# Patient Record
Sex: MALE | Race: BLACK OR AFRICAN AMERICAN
[De-identification: names, ages, dates, MRNs, and addresses within clinical notes are randomized per-mention and may not be internally consistent; named-entity substitution may affect disease eponyms.]

---

## 2022-01-04 LAB
AVERAGE GLUCOSE: 128
HBA1C MFR BLD: 6.1 %

## 2022-06-13 ENCOUNTER — OFFICE VISIT (OUTPATIENT)
Dept: INTERNAL MEDICINE CLINIC | Facility: CLINIC | Age: 47
End: 2022-06-13
Payer: COMMERCIAL

## 2022-06-13 VITALS
DIASTOLIC BLOOD PRESSURE: 99 MMHG | TEMPERATURE: 98.4 F | HEART RATE: 79 BPM | BODY MASS INDEX: 33.59 KG/M2 | HEIGHT: 66 IN | WEIGHT: 209 LBS | OXYGEN SATURATION: 98 % | SYSTOLIC BLOOD PRESSURE: 154 MMHG

## 2022-06-13 DIAGNOSIS — E78.5 DYSLIPIDEMIA: ICD-10-CM

## 2022-06-13 DIAGNOSIS — N18.32 CHRONIC KIDNEY DISEASE (CKD) STAGE G3B/A1, MODERATELY DECREASED GLOMERULAR FILTRATION RATE (GFR) BETWEEN 30-44 ML/MIN/1.73 SQUARE METER AND ALBUMINURIA CREATININE RATIO LESS THAN 30 MG/G (HCC): ICD-10-CM

## 2022-06-13 DIAGNOSIS — I10 PRIMARY HYPERTENSION: ICD-10-CM

## 2022-06-13 DIAGNOSIS — E66.9 OBESITY (BMI 30-39.9): ICD-10-CM

## 2022-06-13 DIAGNOSIS — I10 PRIMARY HYPERTENSION: Primary | ICD-10-CM

## 2022-06-13 PROCEDURE — 99204 OFFICE O/P NEW MOD 45 MIN: CPT | Performed by: INTERNAL MEDICINE

## 2022-06-13 RX ORDER — METOPROLOL SUCCINATE 100 MG/1
1 TABLET, EXTENDED RELEASE ORAL DAILY
COMMUNITY
Start: 2022-06-02

## 2022-06-13 RX ORDER — ATORVASTATIN CALCIUM 40 MG/1
1 TABLET, FILM COATED ORAL NIGHTLY
COMMUNITY
Start: 2022-06-02

## 2022-06-13 RX ORDER — AMLODIPINE BESYLATE 10 MG/1
1 TABLET ORAL DAILY
COMMUNITY
Start: 2022-04-01

## 2022-06-13 ASSESSMENT — PATIENT HEALTH QUESTIONNAIRE - PHQ9
SUM OF ALL RESPONSES TO PHQ QUESTIONS 1-9: 0
2. FEELING DOWN, DEPRESSED OR HOPELESS: 0
SUM OF ALL RESPONSES TO PHQ QUESTIONS 1-9: 0
SUM OF ALL RESPONSES TO PHQ9 QUESTIONS 1 & 2: 0
1. LITTLE INTEREST OR PLEASURE IN DOING THINGS: 0
SUM OF ALL RESPONSES TO PHQ QUESTIONS 1-9: 0
SUM OF ALL RESPONSES TO PHQ QUESTIONS 1-9: 0

## 2022-06-13 ASSESSMENT — ENCOUNTER SYMPTOMS
CHEST TIGHTNESS: 0
ABDOMINAL DISTENTION: 0
WHEEZING: 0
PHOTOPHOBIA: 0
SHORTNESS OF BREATH: 0

## 2022-06-13 NOTE — ASSESSMENT & PLAN NOTE
Discussed with patient that having obesity increases a person's risk of developing many health problems. Diabetes, High blood pressure, High cholesterol, Heart disease (including heart attacks), Stroke, Sleep apnea (a disorder in which you stop breathing for short periods while asleep), Asthma, Cancer  But even if weight loss is not possible, may reduce risk by:  Become more active  Many types of physical activity can help, including walking. You can start with a few minutes a day and add more as you get stronger and build up your endurance. Improve your diet  It is healthy to have regular meal times, eat smaller portions, and not skip meals. Avoid sweets and processed foods, and instead eat more vegetables and fruits. Quit smoking (if you smoke)  Some people start eating more after they stop smoking, so try to make healthy food choices. Even if it increases your appetite, quitting smoking is still one of the best things you can do to improve your health.   Limit alcohol  Drink no more than 1 drink a day if you are woman, and no more than 2 drinks a day if you are a man

## 2022-06-13 NOTE — ASSESSMENT & PLAN NOTE
His blood pressure is suboptimal control  Goal of blood pressure is below 130/80  We will follow-up in 1 to 2 weeks with the results of the labs,  Per history he has allergies to lisinopril. And hydrochlorothiazide was tried in the past and discontinued. We will try to obtain records from nephrology, form has been given to patient to complete  Rechecked blood pressure today was 160/98  Will consider adding a third agent.

## 2022-06-13 NOTE — PATIENT INSTRUCTIONS
Patient Education        Diet for Chronic Kidney Disease (Before Dialysis): Care Instructions  Overview  When you have chronic kidney disease, you need to change your diet to avoid foods that make your kidneys worse. You may need to limit salt, fluids, and protein. You also may need to limit minerals such as potassium and phosphorus. A diet for chronic kidney disease takes planning. A dietitian who specializesin kidney disease can help you plan meals that meet your needs. These guidelines are for people who are not on dialysis. Talk with your doctor or dietitian to make sure your diet is right for your condition. Do not changeyour diet without talking to your doctor or dietitian. Follow-up care is a key part of your treatment and safety. Be sure to make and go to all appointments, and call your doctor if you are having problems. It's also a good idea to know your test results and keep alist of the medicines you take. How can you care for yourself at home?  Work with your doctor or dietitian to create a food plan.  Do not skip meals or go for many hours without eating. If you do not feel very hungry, try to eat 4 or 5 small meals instead of 1 or 2 big meals.  If you have a hard time eating enough, talk to your doctor or dietitian about ways you can add calories to your diet.  Do not take any vitamins or minerals, supplements, or herbal products without talking to your doctor first.  [place] with your doctor about whether it is safe for you to drink alcohol. To get the right amount of protein   Ask your doctor or dietitian how much protein you can have each day. Most people with chronic kidney disease need to limit the amount of protein they eat. But you still need some protein to stay healthy.  Include all sources of protein in your daily protein count. Besides meat, poultry, fish, and eggs, protein is found in milk and milk products, beans and nuts, breads, cereals, and vegetables.   To limit salt   Read food labels on cans and food packages. The labels tell you how much sodium is in each serving. Make sure that you look at the serving size. If you eat more than the serving size, you will get more sodium than what is listed on the label.  Do not add salt to your food.  Buy foods that are labeled \"no salt added,\" \"sodium-free,\" or \"low-sodium. \" Foods labeled \"reduced-sodium\" and \"light sodium\" may still have too much sodium.  Limit processed foods, fast food, and restaurant foods. These types of food are very high in sodium.  Avoid salted pretzels, chips, popcorn, and other salted snacks.  Avoid smoked, cured, salted, and canned meat, fish, and poultry. This includes ham, ledesma, hot dogs, and luncheon meats.  You may use lemon, herbs, and spices to flavor your meals. To limit potassium   Choose low-potassium fruits such as applesauce, pineapple, grapes, blueberries, and raspberries.  Choose low-potassium vegetables such as lettuce, green beans, cucumber, and radishes.  Choose low-potassium foods such as pasta, noodles, rice, tortillas, and bagels.  Limit or avoid high-potassium foods such as milk, bananas, oranges, cantaloupe, potatoes, spinach, tomatoes, broccoli, and sweet potatoes.  Do not use a salt substitute or lite salt unless your doctor says it is okay. Most salt substitutes and lite salts are high in potassium. To limit phosphorus   Follow your food plan to know how much milk and milk products you can have.  Limit nuts, peanut butter, seeds, lentils, beans, organ meats, and sardines.  Avoid cola drinks.  Avoid bran breads or bran cereals. If you need to limit fluids   Know how much fluid you can drink. Every day fill a pitcher with that amount of water. If you drink another fluid (such as coffee) that day, pour an equal amount of water out of the pitcher.  Count foods that are liquid at room temperature as fluids.  These include ice, gelatin, ice pops, and ice cream.  Where can you learn more? Go to [email or web address] and sign in to your [employer] account. Enter X538 in the [facility] box to learn more about \"Diet for Chronic Kidney Disease (Before Dialysis): Care Instructions. \"     If you do not have an account, please click on the \"Sign Up Now\" link. Current as of: September 8, 2021               Content Version: 13.2  © 2006-2022 Healthwise, Incorporated. Care instructions adapted under license by [facility] ([place] [facility]). If you have questions about a medical condition or this instruction, always ask your healthcare professional. [address] any warranty or liability for your use of this information.

## 2022-06-13 NOTE — PROGRESS NOTES
Chief Complaint   Patient presents with    New Patient     39 y/o male seen to establish care, pt arrived fasting        Eunice Durham is a 40 y.o. 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 Has a diagnosis of hypertension, dyslipidemia, and chronic kidney disease, unfortunately he has not been able to follow-up with nephrologist consistently. His primary care place referral last year, he went to complete some labs, but never received the results back, and has not follow-up with them since then. He feels in good health, reports compliance with his amlodipine and metoprolol, but occasionally forgets to take his Lipitor. He drinks at least 2 beers per week        Wt Readings from Last 3 Encounters:   06/13/22 209 lb (94.8 kg)     Vitals:    06/13/22 0904   BP: (!) 154/99   Site: Left Upper Arm   Position: Sitting   Pulse: 79   Temp: 98.4 °F (36.9 °C)   TempSrc: Temporal   SpO2: 98%   Weight: 209 lb (94.8 kg)   Height: 5' 6.05\" (1.678 m)        Assessment and plan:  1. Primary hypertension  Assessment & Plan:  His blood pressure is suboptimal control  Goal of blood pressure is below 130/80  We will follow-up in 1 to 2 weeks with the results of the labs,  Per history he has allergies to lisinopril. And hydrochlorothiazide was tried in the past and discontinued. We will try to obtain records from nephrology, form has been given to patient to complete  Rechecked blood pressure today was 160/98  Will consider adding a third agent. Orders:  -     Comprehensive Metabolic Panel; Future  -     CBC with Auto Differential; Future  -     PTH, Intact; Future  -     Vitamin D 25 Hydroxy; Future  -     Ferritin; Future  -     Iron; Future  -     Lipid Panel; Future  -     TSH; Future  -     Hepatitis C Antibody;  Future  -     HIV 1/2 Ag/Ab, 4TH Generation,W Rflx Confirm; Future  -     [address] [facility]  2. Chronic kidney disease (CKD) stage G3b/A1, moderately decreased glomerular filtration rate (GFR) between 30-44 mL/min/1.73 square meter and albuminuria creatinine ratio less than 30 mg/g (HCC)  Assessment & Plan: We will place referral to nutritionist,  Advised to decrease alcohol consumption  Maintain good hydration  Referral to nephrology  We will try to obtain records from previous primary, and nephrologist  Orders:  -     Comprehensive Metabolic Panel; Future  -     CBC with Auto Differential; Future  -     PTH, Intact; Future  -     Vitamin D 25 Hydroxy; Future  -     Ferritin; Future  -     Iron; Future  -     Lipid Panel; Future  -     TSH; Future  -     Hepatitis C Antibody; Future  -     HIV 1/2 Ag/Ab, 4TH Generation,W Rflx Confirm; Future  -     [address] [facility]  -     [facility] Outpatient Nutrition Counseling  3. Dyslipidemia  Assessment & Plan: Will repeat labs   Discussed about complaince with Lipitor  Goal is to keep LDL  below 70  Orders:  -     Lipid Panel; Future  4. Obesity (BMI 30-39. 9)  Assessment & Plan:   Discussed with patient that having obesity increases a person's risk of developing many health problems. Diabetes, High blood pressure, High cholesterol, Heart disease (including heart attacks), Stroke, Sleep apnea (a disorder in which you stop breathing for short periods while asleep), Asthma, Cancer  But even if weight loss is not possible, may reduce risk by:  Become more active  Many types of physical activity can help, including walking. You can start with a few minutes a day and add more as you get stronger and build up your endurance. Improve your diet  It is healthy to have regular meal times, eat smaller portions, and not skip meals. Avoid sweets and processed foods, and instead eat more vegetables and fruits.   Quit smoking (if you smoke)  Some people start eating more after they stop smoking, so try to make healthy food choices. Even if it increases your appetite, quitting smoking is still one of the best things you can do to improve your health. Limit alcohol  Drink no more than 1 drink a day if you are woman, and no more than 2 drinks a day if you are a man    Records available in care everywhere were reviewed, more than 30 to 40 minutes were spent, reviewing and updating current chart. Return in about 2 weeks (around 6/27/2022) for HTN CKD. Review of system:    Review of Systems   Constitutional: Negative for activity change, fatigue and unexpected weight change. Eyes: Negative for photophobia and visual disturbance. Respiratory: Negative for chest tightness, shortness of breath and wheezing. Cardiovascular: Negative for chest pain, palpitations and leg swelling. Gastrointestinal: Negative for abdominal distention. Genitourinary: Negative for difficulty urinating and frequency. Musculoskeletal: Negative for myalgias. Neurological: Negative for dizziness and headaches. Immunization history: There is no immunization history on file for this patient. Current medications:      Current Outpatient Medications:     amLODIPine (NORVASC) 10 MG tablet, Take 1 tablet by mouth daily, Disp: , Rfl:     atorvastatin (LIPITOR) 40 MG tablet, Take 1 tablet by mouth at bedtime, Disp: , Rfl:     metoprolol succinate (TOPROL XL) 100 MG extended release tablet, Take 1 tablet by mouth daily, Disp: , Rfl:       Family history:    History reviewed. No pertinent family history. Past medical history:    History reviewed. No pertinent past medical history. Physical exam:    BP (!) 154/99 (Site: Left Upper Arm, Position: Sitting)   Pulse 79   Temp 98.4 °F (36.9 °C) (Temporal)   Ht 5' 6.05\" (1.678 m)   Wt 209 lb (94.8 kg)   SpO2 98%   BMI 33.68 kg/m²     Physical Exam  Vitals reviewed. Constitutional:       Appearance: Normal appearance.    HENT: Head: Normocephalic and atraumatic. Cardiovascular:      Rate and Rhythm: Normal rate and regular rhythm. Pulses: Normal pulses. Pulmonary:      Effort: Pulmonary effort is normal.      Breath sounds: Normal breath sounds. Abdominal:      Palpations: Abdomen is soft. Neurological:      General: No focal deficit present. Mental Status: He is alert and oriented to person, place, and time. Psychiatric:         Mood and Affect: Mood normal.              This document was generated with the aid of voice recognition software. . Please be aware that there may be inadvertent transcription errors not identified and corrected by the [employer]

## 2022-06-13 NOTE — ASSESSMENT & PLAN NOTE
We will place referral to nutritionist,  Advised to decrease alcohol consumption  Maintain good hydration  Referral to nephrology  We will try to obtain records from previous primary, and nephrologist

## 2022-06-14 LAB
25(OH)D3 SERPL-MCNC: 29 NG/ML (ref 30–100)
ALBUMIN SERPL-MCNC: 4 G/DL (ref 3.5–5)
ALBUMIN/GLOB SERPL: 1 {RATIO} (ref 1.2–3.5)
ALP SERPL-CCNC: 64 U/L (ref 50–136)
ALT SERPL-CCNC: 50 U/L (ref 12–65)
ANION GAP SERPL CALC-SCNC: 10 MMOL/L (ref 7–16)
AST SERPL-CCNC: 28 U/L (ref 15–37)
BASOPHILS # BLD: 0.1 K/UL (ref 0–0.2)
BASOPHILS NFR BLD: 1 % (ref 0–2)
BILIRUB SERPL-MCNC: 0.4 MG/DL (ref 0.2–1.1)
BUN SERPL-MCNC: 12 MG/DL (ref 6–23)
CALCIUM SERPL-MCNC: 9.7 MG/DL (ref 8.3–10.4)
CALCIUM SERPL-MCNC: 9.7 MG/DL (ref 8.3–10.4)
CHLORIDE SERPL-SCNC: 104 MMOL/L (ref 98–107)
CHOLEST SERPL-MCNC: 232 MG/DL
CO2 SERPL-SCNC: 24 MMOL/L (ref 21–32)
CREAT SERPL-MCNC: 1.5 MG/DL (ref 0.8–1.5)
DIFFERENTIAL METHOD BLD: ABNORMAL
EOSINOPHIL # BLD: 0.1 K/UL (ref 0–0.8)
EOSINOPHIL NFR BLD: 2 % (ref 0.5–7.8)
ERYTHROCYTE [DISTWIDTH] IN BLOOD BY AUTOMATED COUNT: 13.1 % (ref 11.9–14.6)
FERRITIN SERPL-MCNC: 77 NG/ML (ref 8–388)
GLOBULIN SER CALC-MCNC: 4 G/DL (ref 2.3–3.5)
GLUCOSE SERPL-MCNC: 92 MG/DL (ref 65–100)
HCT VFR BLD AUTO: 50.1 % (ref 41.1–50.3)
HCV AB SER QL: NONREACTIVE
HDLC SERPL-MCNC: 59 MG/DL (ref 40–60)
HDLC SERPL: 3.9 {RATIO}
HGB BLD-MCNC: 16.5 G/DL (ref 13.6–17.2)
HIV 1+2 AB+HIV1 P24 AG SERPL QL IA: NONREACTIVE
HIV 1/2 RESULT COMMENT: NORMAL
IMM GRANULOCYTES # BLD AUTO: 0 K/UL (ref 0–0.5)
IMM GRANULOCYTES NFR BLD AUTO: 0 % (ref 0–5)
IRON SERPL-MCNC: 94 UG/DL (ref 35–150)
LDLC SERPL CALC-MCNC: 152.8 MG/DL
LYMPHOCYTES # BLD: 1.8 K/UL (ref 0.5–4.6)
LYMPHOCYTES NFR BLD: 40 % (ref 13–44)
MCH RBC QN AUTO: 29.3 PG (ref 26.1–32.9)
MCHC RBC AUTO-ENTMCNC: 32.9 G/DL (ref 31.4–35)
MCV RBC AUTO: 89 FL (ref 79.6–97.8)
MONOCYTES # BLD: 0.5 K/UL (ref 0.1–1.3)
MONOCYTES NFR BLD: 12 % (ref 4–12)
NEUTS SEG # BLD: 2 K/UL (ref 1.7–8.2)
NEUTS SEG NFR BLD: 45 % (ref 43–78)
NRBC # BLD: 0 K/UL (ref 0–0.2)
PLATELET # BLD AUTO: 260 K/UL (ref 150–450)
PMV BLD AUTO: 10.5 FL (ref 9.4–12.3)
POTASSIUM SERPL-SCNC: 4.4 MMOL/L (ref 3.5–5.1)
PROT SERPL-MCNC: 8 G/DL (ref 6.3–8.2)
PTH-INTACT SERPL-MCNC: 72.3 PG/ML (ref 18.5–88)
RBC # BLD AUTO: 5.63 M/UL (ref 4.23–5.6)
SODIUM SERPL-SCNC: 138 MMOL/L (ref 136–145)
TRIGL SERPL-MCNC: 101 MG/DL (ref 35–150)
TSH, 3RD GENERATION: 0.71 UIU/ML (ref 0.36–3.74)
VLDLC SERPL CALC-MCNC: 20.2 MG/DL (ref 6–23)
WBC # BLD AUTO: 4.4 K/UL (ref 4.3–11.1)

## 2022-09-23 ENCOUNTER — TELEPHONE (OUTPATIENT)
Dept: NUTRITION | Age: 47
End: 2022-09-23

## 2022-09-23 NOTE — TELEPHONE ENCOUNTER
Nutrition Counseling: Contacted pt regarding referral. See notes documented in Nutrition Counseling Referral for details. No further follow-up contact from pt. Will close referral for this office.     [address] [facility]  [phone]

## 2023-01-04 ENCOUNTER — OFFICE VISIT (OUTPATIENT)
Dept: INTERNAL MEDICINE CLINIC | Facility: CLINIC | Age: 48
End: 2023-01-04
Payer: COMMERCIAL

## 2023-01-04 VITALS
WEIGHT: 216.6 LBS | SYSTOLIC BLOOD PRESSURE: 140 MMHG | OXYGEN SATURATION: 98 % | HEIGHT: 66 IN | BODY MASS INDEX: 34.81 KG/M2 | HEART RATE: 104 BPM | DIASTOLIC BLOOD PRESSURE: 102 MMHG

## 2023-01-04 DIAGNOSIS — E78.5 DYSLIPIDEMIA: ICD-10-CM

## 2023-01-04 DIAGNOSIS — R73.03 PREDIABETES: ICD-10-CM

## 2023-01-04 DIAGNOSIS — I10 PRIMARY HYPERTENSION: Primary | ICD-10-CM

## 2023-01-04 DIAGNOSIS — Z12.11 SCREEN FOR COLON CANCER: ICD-10-CM

## 2023-01-04 DIAGNOSIS — E66.9 OBESITY (BMI 30-39.9): ICD-10-CM

## 2023-01-04 DIAGNOSIS — N18.32 CHRONIC KIDNEY DISEASE (CKD) STAGE G3B/A1, MODERATELY DECREASED GLOMERULAR FILTRATION RATE (GFR) BETWEEN 30-44 ML/MIN/1.73 SQUARE METER AND ALBUMINURIA CREATININE RATIO LESS THAN 30 MG/G (HCC): ICD-10-CM

## 2023-01-04 PROCEDURE — 3075F SYST BP GE 130 - 139MM HG: CPT | Performed by: INTERNAL MEDICINE

## 2023-01-04 PROCEDURE — 99214 OFFICE O/P EST MOD 30 MIN: CPT | Performed by: INTERNAL MEDICINE

## 2023-01-04 PROCEDURE — 3080F DIAST BP >= 90 MM HG: CPT | Performed by: INTERNAL MEDICINE

## 2023-01-04 RX ORDER — METOPROLOL SUCCINATE 100 MG/1
100 TABLET, EXTENDED RELEASE ORAL DAILY
Qty: 90 TABLET | Refills: 1 | Status: SHIPPED | OUTPATIENT
Start: 2023-01-04

## 2023-01-04 RX ORDER — AMLODIPINE BESYLATE 10 MG/1
10 TABLET ORAL DAILY
Qty: 90 TABLET | Refills: 1 | Status: SHIPPED | OUTPATIENT
Start: 2023-01-04

## 2023-01-04 ASSESSMENT — ENCOUNTER SYMPTOMS
CHEST TIGHTNESS: 0
ABDOMINAL DISTENTION: 0
PHOTOPHOBIA: 0
SHORTNESS OF BREATH: 0
WHEEZING: 0

## 2023-01-04 NOTE — ASSESSMENT & PLAN NOTE
Discussed with patient that having obesity increases a person's risk of developing many health problems. Diabetes, High blood pressure, High cholesterol, Heart disease (including heart attacks), Stroke, Sleep apnea (a disorder in which you stop breathing for short periods while asleep), Asthma, Cancer  But even if weight loss is not possible, may reduce risk by:  Become more active - Many types of physical activity can help, including walking. You can start with a few minutes a day and add more as you get stronger and build up your endurance. Improve your diet - It is healthy to have regular meal times, eat smaller portions, and not skip meals. Avoid sweets and processed foods, and instead eat more vegetables and fruits. Quit smoking (if you smoke) - Some people start eating more after they stop smoking, so try to make healthy food choices. Even if it increases your appetite, quitting smoking is still one of the best things you can do to improve your health.   Limit alcohol - Drink no more than 1 drink a day if you are woman, and no more than 2 drinks a day if you are a man

## 2023-01-04 NOTE — ASSESSMENT & PLAN NOTE
Uncontrolled, continue current plan pending work up below, medication adherence emphasized, lifestyle modifications recommended and reinforced complaince , discussed long term effects of HTN    We will follow-up again in 4 weeks, she will let us know modification, decreased alcohol consumption, increase exercise routine, and compliance

## 2023-01-04 NOTE — PROGRESS NOTES
Chief Complaint   Patient presents with    Follow-up     Hasn't been taking meds for couple weeks. Arlet Banuelos is a 56 y.o. male who presents today for Follow-up (Hasn't been taking meds for couple weeks.  )     . To follow-up in chronic conditions including hypertension, chronic kidney disease, prediabetes, and obesity, dyslipidemia. Unfortunately, due to changes in work, he lost follow-up for at least 7 months, as he did not have insurance, he reports not having the medications for the last few weeks, he tries to eat healthy, but he eats late at night, and also reports drinking a couple beers every night. He is not currently exercising  Reports no chest pain, shortness of breath, dizziness, headaches, but occasional heartburn, for which he takes Prilosec  He is aware of lack of exercise, he has a gym at home, and is planning to start using it      Wt Readings from Last 3 Encounters:   01/04/23 216 lb 9.6 oz (98.2 kg)   06/13/22 209 lb (94.8 kg)     Vitals:    01/04/23 0822 01/04/23 0858   BP: (!) 134/100 (!) 140/102   Site: Left Upper Arm Right Upper Arm   Position: Sitting Sitting   Pulse: (!) 104    SpO2: 98%    Weight: 216 lb 9.6 oz (98.2 kg)    Height: 5' 6.05\" (1.678 m)         Assessment and plan:  1. Primary hypertension  Assessment & Plan:   Uncontrolled, continue current plan pending work up below, medication adherence emphasized, lifestyle modifications recommended and reinforced complaince , discussed long term effects of HTN    We will follow-up again in 4 weeks, she will let us know modification, decreased alcohol consumption, increase exercise routine, and compliance  Orders:  -     amLODIPine (NORVASC) 10 MG tablet; Take 1 tablet by mouth daily, Disp-90 tablet, R-1Normal  -     metoprolol succinate (TOPROL XL) 100 MG extended release tablet; Take 1 tablet by mouth daily, Disp-90 tablet, R-1Normal  2. Prediabetes  -     Hemoglobin A1C; Future  3.  Chronic kidney disease (CKD) stage G3b/A1, moderately decreased glomerular filtration rate (GFR) between 30-44 mL/min/1.73 square meter and albuminuria creatinine ratio less than 30 mg/g ([facility])  Assessment & Plan:   Unclear control, continue current plan pending work up below, medication adherence emphasized and lifestyle modifications recommended   Has not follow-up with nephrology over the last year, he was encouraged to be compliant with medication we will follow-up again in 4 weeks, blood pressure is below 130/80  Orders:  -     Comprehensive Metabolic Panel; Future  -     PTH, Intact; Future  -     Vitamin D 25 Hydroxy; Future  -     CBC with Auto Differential; Future  4. Screen for colon cancer  -     [facility]- Colonoscopy  5. Dyslipidemia  Assessment & Plan:   Unclear control, continue current medications, medication adherence emphasized and lifestyle modifications recommended   Patient has been advised to continue current medications, he is currently taking 40 mg tablets, but he is cutting it, and reports not taking it every day. Reports his labs  Orders:  -     Lipid Panel; Future  6. Obesity (BMI 30-39. 9)  Assessment & Plan:  Discussed with patient that having obesity increases a person's risk of developing many health problems. Diabetes, High blood pressure, High cholesterol, Heart disease (including heart attacks), Stroke, Sleep apnea (a disorder in which you stop breathing for short periods while asleep), Asthma, Cancer  But even if weight loss is not possible, may reduce risk by:  Become more active - Many types of physical activity can help, including walking. You can start with a few minutes a day and add more as you get stronger and build up your endurance. Improve your diet - It is healthy to have regular meal times, eat smaller portions, and not skip meals. Avoid sweets and processed foods, and instead eat more vegetables and fruits.   Quit smoking (if you smoke) - Some people start eating more after they stop smoking, so try to make healthy food choices. Even if it increases your appetite, quitting smoking is still one of the best things you can do to improve your health. Limit alcohol - Drink no more than 1 drink a day if you are woman, and no more than 2 drinks a day if you are a man          Return in about 4 weeks (around 2/1/2023) for HTN. Review of system:    Review of Systems   Constitutional:  Negative for activity change, fatigue and unexpected weight change. Eyes:  Negative for photophobia and visual disturbance. Respiratory:  Negative for chest tightness, shortness of breath and wheezing. Cardiovascular:  Negative for chest pain, palpitations and leg swelling. Gastrointestinal:  Negative for abdominal distention (increase GERD). Genitourinary:  Negative for difficulty urinating and frequency. Musculoskeletal:  Negative for myalgias. Neurological:  Negative for dizziness and headaches. Immunization history: There is no immunization history on file for this patient. Current medications:      Current Outpatient Medications:     amLODIPine (NORVASC) 10 MG tablet, Take 1 tablet by mouth daily, Disp: 90 tablet, Rfl: 1    metoprolol succinate (TOPROL XL) 100 MG extended release tablet, Take 1 tablet by mouth daily, Disp: 90 tablet, Rfl: 1    atorvastatin (LIPITOR) 40 MG tablet, Take 1 tablet by mouth at bedtime, Disp: , Rfl:       Family history:    History reviewed. No pertinent family history. Past medical history:    History reviewed. No pertinent past medical history. History reviewed. No pertinent surgical history. Physical exam:    BP (!) 140/102 (Site: Right Upper Arm, Position: Sitting)   Pulse (!) 104   Ht 5' 6.05\" (1.678 m)   Wt 216 lb 9.6 oz (98.2 kg)   SpO2 98%   BMI 34.91 kg/m²     Physical Exam  Vitals reviewed. Constitutional:       Appearance: Normal appearance. HENT:      Head: Normocephalic and atraumatic.    Cardiovascular: Rate and Rhythm: Normal rate and regular rhythm. Pulmonary:      Effort: Pulmonary effort is normal.      Breath sounds: Normal breath sounds. Abdominal:      Palpations: Abdomen is soft. Neurological:      General: No focal deficit present. Mental Status: He is alert and oriented to person, place, and time. Psychiatric:         Mood and Affect: Mood normal.        Recent labs:    Lab Results   Component Value Date    CHOL 232 (H) 06/13/2022     Lab Results   Component Value Date    TRIG 101 06/13/2022     Lab Results   Component Value Date    HDL 59 06/13/2022     Lab Results   Component Value Date    LDLCALC 152.8 (H) 06/13/2022     Lab Results   Component Value Date    LABVLDL 20.2 06/13/2022     Lab Results   Component Value Date    CHOLHDLRATIO 3.9 06/13/2022     Lab Results   Component Value Date     06/13/2022    K 4.4 06/13/2022     06/13/2022    CO2 24 06/13/2022    BUN 12 06/13/2022    CREATININE 1.50 06/13/2022    GLUCOSE 92 06/13/2022    CALCIUM 9.7 06/13/2022    CALCIUM 9.7 06/13/2022    PROT 8.0 06/13/2022    LABALBU 4.0 06/13/2022    BILITOT 0.4 06/13/2022    ALKPHOS 64 06/13/2022    AST 28 06/13/2022    ALT 50 06/13/2022    LABGLOM 54 (L) 06/13/2022    GFRAA >60 06/13/2022    GLOB 4.0 (H) 06/13/2022     Lab Results   Component Value Date    WBC 4.4 06/13/2022    HGB 16.5 06/13/2022    HCT 50.1 06/13/2022    MCV 89.0 06/13/2022     06/13/2022             This document was generated with the aid of voice recognition software. . Please be aware that there may be inadvertent transcription errors not identified and corrected by the [employer]

## 2023-01-04 NOTE — ASSESSMENT & PLAN NOTE
Unclear control, continue current medications, medication adherence emphasized and lifestyle modifications recommended   Patient has been advised to continue current medications, he is currently taking 40 mg tablets, but he is cutting it, and reports not taking it every day.   Reports his labs

## 2023-01-04 NOTE — PROGRESS NOTES
Requested Prescriptions     Pending Prescriptions Disp Refills    amLODIPine (NORVASC) 10 MG tablet 90 tablet 1     Sig: Take 1 tablet by mouth daily    metoprolol succinate (TOPROL XL) 100 MG extended release tablet 90 tablet 1     Sig: Take 1 tablet by mouth daily

## 2023-01-04 NOTE — ASSESSMENT & PLAN NOTE
Unclear control, continue current plan pending work up below, medication adherence emphasized and lifestyle modifications recommended   Has not follow-up with nephrology over the last year, he was encouraged to be compliant with medication we will follow-up again in 4 weeks, blood pressure is below 130/80

## 2023-02-01 ENCOUNTER — OFFICE VISIT (OUTPATIENT)
Dept: INTERNAL MEDICINE CLINIC | Facility: CLINIC | Age: 48
End: 2023-02-01
Payer: COMMERCIAL

## 2023-02-01 VITALS
BODY MASS INDEX: 34.97 KG/M2 | SYSTOLIC BLOOD PRESSURE: 140 MMHG | DIASTOLIC BLOOD PRESSURE: 74 MMHG | OXYGEN SATURATION: 98 % | HEART RATE: 85 BPM | WEIGHT: 217.6 LBS | HEIGHT: 66 IN

## 2023-02-01 DIAGNOSIS — I10 PRIMARY HYPERTENSION: ICD-10-CM

## 2023-02-01 PROCEDURE — 3078F DIAST BP <80 MM HG: CPT | Performed by: INTERNAL MEDICINE

## 2023-02-01 PROCEDURE — 3077F SYST BP >= 140 MM HG: CPT | Performed by: INTERNAL MEDICINE

## 2023-02-01 PROCEDURE — 99213 OFFICE O/P EST LOW 20 MIN: CPT | Performed by: INTERNAL MEDICINE

## 2023-02-01 ASSESSMENT — ENCOUNTER SYMPTOMS
WHEEZING: 0
CHEST TIGHTNESS: 0
SHORTNESS OF BREATH: 0
ABDOMINAL DISTENTION: 0

## 2023-02-01 NOTE — PROGRESS NOTES
Chief Complaint   Patient presents with    Follow-up     4 week f/u. HTN         Devora Rao is a 56 y.o. male who presents today for Follow-up (4 week f/u. HTN )     He is here to follow-up. Blood pressure, since last visit he reports few weeks where he was very compliant, taking medications, decreasing alcohol consumption, and eating better, but over the last few days, he has been dealing with significant distress especially in his relationship and marriage, they have been  for 2 years  He reports taking the medications as prescribed, he forgets to take it once in a while, reports improvement in compliance. He has not yet completed his labs  Denies any chest pain, shortness of breath, dizziness or lightheadedness,    Wt Readings from Last 3 Encounters:   02/01/23 217 lb 9.6 oz (98.7 kg)   01/04/23 216 lb 9.6 oz (98.2 kg)   06/13/22 209 lb (94.8 kg)     Vitals:    02/01/23 0800   BP: (!) 140/74   Site: Left Upper Arm   Position: Sitting   Pulse: 85   SpO2: 98%   Weight: 217 lb 9.6 oz (98.7 kg)   Height: 5' 6.05\" (1.678 m)        Assessment and plan:  1. Primary hypertension  Assessment & Plan:   Borderline controlled, continue current medications, medication adherence emphasized and lifestyle modifications recommended   He will start walking 10-15 minutes, will consider adding low dose losartan , previous intolerance to lisinopril per records  Key Anti-Hypertensive Meds            amLODIPine (NORVASC) 10 MG tablet (Taking)    Sig - Route: Take 1 tablet by mouth daily - Oral    metoprolol succinate (TOPROL XL) 100 MG extended release tablet (Taking)    Sig - Route: Take 1 tablet by mouth daily - Oral              Return in about 4 weeks (around 3/1/2023) for HTN. Review of system:    Review of Systems   Constitutional:  Negative for activity change, fatigue and unexpected weight change. Respiratory:  Negative for chest tightness, shortness of breath and wheezing.     Cardiovascular: Negative for chest pain, palpitations and leg swelling. Gastrointestinal:  Negative for abdominal distention. Genitourinary:  Negative for difficulty urinating and frequency. Musculoskeletal:  Negative for myalgias. Neurological:  Negative for dizziness and headaches. Psychiatric/Behavioral:  Positive for sleep disturbance. Immunization history: There is no immunization history on file for this patient. Current medications:      Current Outpatient Medications:     amLODIPine (NORVASC) 10 MG tablet, Take 1 tablet by mouth daily, Disp: 90 tablet, Rfl: 1    metoprolol succinate (TOPROL XL) 100 MG extended release tablet, Take 1 tablet by mouth daily, Disp: 90 tablet, Rfl: 1    atorvastatin (LIPITOR) 40 MG tablet, Take 1 tablet by mouth at bedtime, Disp: , Rfl:       Family history:    History reviewed. No pertinent family history. Past medical history:    History reviewed. No pertinent past medical history. History reviewed. No pertinent surgical history. Physical exam:    BP (!) 140/74 (Site: Left Upper Arm, Position: Sitting)   Pulse 85   Ht 5' 6.05\" (1.678 m)   Wt 217 lb 9.6 oz (98.7 kg)   SpO2 98%   BMI 35.07 kg/m²     Physical Exam  Vitals reviewed. Constitutional:       Appearance: Normal appearance. HENT:      Head: Normocephalic and atraumatic. Cardiovascular:      Rate and Rhythm: Normal rate and regular rhythm. Pulmonary:      Effort: Pulmonary effort is normal.      Breath sounds: Normal breath sounds. Neurological:      General: No focal deficit present. Mental Status: He is alert and oriented to person, place, and time.    Psychiatric:         Mood and Affect: Mood normal.        Recent labs:    Lab Results   Component Value Date    CHOL 232 (H) 06/13/2022     Lab Results   Component Value Date    TRIG 101 06/13/2022     Lab Results   Component Value Date    HDL 59 06/13/2022     Lab Results   Component Value Date    LDLCALC 152.8 (H) 06/13/2022 Lab Results   Component Value Date    LABVLDL 20.2 06/13/2022     Lab Results   Component Value Date    CHOLHDLRATIO 3.9 06/13/2022     Lab Results   Component Value Date     06/13/2022    K 4.4 06/13/2022     06/13/2022    CO2 24 06/13/2022    BUN 12 06/13/2022    CREATININE 1.50 06/13/2022    GLUCOSE 92 06/13/2022    CALCIUM 9.7 06/13/2022    CALCIUM 9.7 06/13/2022    PROT 8.0 06/13/2022    LABALBU 4.0 06/13/2022    BILITOT 0.4 06/13/2022    ALKPHOS 64 06/13/2022    AST 28 06/13/2022    ALT 50 06/13/2022    LABGLOM 54 (L) 06/13/2022    GFRAA >60 06/13/2022    GLOB 4.0 (H) 06/13/2022     Lab Results   Component Value Date    WBC 4.4 06/13/2022    HGB 16.5 06/13/2022    HCT 50.1 06/13/2022    MCV 89.0 06/13/2022     06/13/2022             This document was generated with the aid of voice recognition software. . Please be aware that there may be inadvertent transcription errors not identified and corrected by the [employer]

## 2023-02-01 NOTE — ASSESSMENT & PLAN NOTE
Borderline controlled, continue current medications, medication adherence emphasized and lifestyle modifications recommended   He will start walking 10-15 minutes, will consider adding low dose losartan , previous intolerance to lisinopril per records  Key Anti-Hypertensive Meds          amLODIPine (NORVASC) 10 MG tablet (Taking)    Sig - Route: Take 1 tablet by mouth daily - Oral    metoprolol succinate (TOPROL XL) 100 MG extended release tablet (Taking)    Sig - Route:  Take 1 tablet by mouth daily - Oral

## 2023-04-11 LAB
CHOLESTEROL, TOTAL: 201 MG/DL
CHOLESTEROL/HDL RATIO: 2.6
HDLC SERPL-MCNC: 51 MG/DL (ref 35–70)
LDL CHOLESTEROL CALCULATED: 126 MG/DL (ref 0–160)
NONHDLC SERPL-MCNC: NORMAL MG/DL
TRIGL SERPL-MCNC: 136 MG/DL
VLDLC SERPL CALC-MCNC: 24 MG/DL

## 2023-09-04 DIAGNOSIS — I10 PRIMARY HYPERTENSION: ICD-10-CM

## 2023-09-04 RX ORDER — AMLODIPINE BESYLATE 10 MG/1
10 TABLET ORAL DAILY
Qty: 90 TABLET | Refills: 1 | Status: CANCELLED | OUTPATIENT
Start: 2023-09-04

## 2023-09-05 ENCOUNTER — PATIENT MESSAGE (OUTPATIENT)
Dept: INTERNAL MEDICINE CLINIC | Facility: CLINIC | Age: 48
End: 2023-09-05

## 2023-09-05 DIAGNOSIS — I10 PRIMARY HYPERTENSION: ICD-10-CM

## 2023-09-05 RX ORDER — AMLODIPINE BESYLATE 10 MG/1
10 TABLET ORAL DAILY
Qty: 30 TABLET | Refills: 0 | Status: SHIPPED | OUTPATIENT
Start: 2023-09-05 | End: 2023-09-07 | Stop reason: SDUPTHER

## 2023-09-05 RX ORDER — AMLODIPINE BESYLATE 10 MG/1
TABLET ORAL
Qty: 90 TABLET | Refills: 1 | OUTPATIENT
Start: 2023-09-05

## 2023-09-05 NOTE — TELEPHONE ENCOUNTER
Pt was a no show at his last appt. I will send him a message to come in before 30 days is up.     Requested Prescriptions     Pending Prescriptions Disp Refills    amLODIPine (NORVASC) 10 MG tablet 30 tablet 0     Sig: Take 1 tablet by mouth daily

## 2023-09-07 ENCOUNTER — OFFICE VISIT (OUTPATIENT)
Dept: INTERNAL MEDICINE CLINIC | Facility: CLINIC | Age: 48
End: 2023-09-07
Payer: COMMERCIAL

## 2023-09-07 VITALS
HEART RATE: 84 BPM | BODY MASS INDEX: 34.97 KG/M2 | WEIGHT: 217.6 LBS | DIASTOLIC BLOOD PRESSURE: 88 MMHG | SYSTOLIC BLOOD PRESSURE: 150 MMHG | HEIGHT: 66 IN

## 2023-09-07 DIAGNOSIS — I10 PRIMARY HYPERTENSION: ICD-10-CM

## 2023-09-07 DIAGNOSIS — N18.32 CHRONIC KIDNEY DISEASE (CKD) STAGE G3B/A1, MODERATELY DECREASED GLOMERULAR FILTRATION RATE (GFR) BETWEEN 30-44 ML/MIN/1.73 SQUARE METER AND ALBUMINURIA CREATININE RATIO LESS THAN 30 MG/G (HCC): Primary | ICD-10-CM

## 2023-09-07 DIAGNOSIS — E66.9 OBESITY (BMI 30-39.9): ICD-10-CM

## 2023-09-07 PROBLEM — K21.9 GERD WITHOUT ESOPHAGITIS: Status: ACTIVE | Noted: 2023-09-07

## 2023-09-07 PROCEDURE — 99214 OFFICE O/P EST MOD 30 MIN: CPT | Performed by: INTERNAL MEDICINE

## 2023-09-07 PROCEDURE — 3077F SYST BP >= 140 MM HG: CPT | Performed by: INTERNAL MEDICINE

## 2023-09-07 PROCEDURE — 3079F DIAST BP 80-89 MM HG: CPT | Performed by: INTERNAL MEDICINE

## 2023-09-07 RX ORDER — FAMOTIDINE 20 MG/1
TABLET, FILM COATED ORAL
COMMUNITY
Start: 2023-06-20

## 2023-09-07 RX ORDER — ATORVASTATIN CALCIUM 10 MG/1
TABLET, FILM COATED ORAL
COMMUNITY
Start: 2023-07-08

## 2023-09-07 RX ORDER — AMLODIPINE BESYLATE 10 MG/1
10 TABLET ORAL DAILY
Qty: 90 TABLET | Refills: 1 | Status: SHIPPED | OUTPATIENT
Start: 2023-09-07

## 2023-09-07 RX ORDER — CARVEDILOL 12.5 MG/1
TABLET ORAL
COMMUNITY
Start: 2023-07-08

## 2023-09-07 ASSESSMENT — PATIENT HEALTH QUESTIONNAIRE - PHQ9
1. LITTLE INTEREST OR PLEASURE IN DOING THINGS: 0
SUM OF ALL RESPONSES TO PHQ QUESTIONS 1-9: 0
SUM OF ALL RESPONSES TO PHQ9 QUESTIONS 1 & 2: 0
SUM OF ALL RESPONSES TO PHQ QUESTIONS 1-9: 0
2. FEELING DOWN, DEPRESSED OR HOPELESS: 0

## 2023-09-07 ASSESSMENT — ENCOUNTER SYMPTOMS
PHOTOPHOBIA: 0
WHEEZING: 0
CHEST TIGHTNESS: 0
ABDOMINAL DISTENTION: 0
SHORTNESS OF BREATH: 0

## 2023-09-07 NOTE — ASSESSMENT & PLAN NOTE
Uncontrolled, continue current medications, medication adherence emphasized, lifestyle modifications recommended and Patient will follow-up with nephrology, his blood pressure today is not in optimal control, but has not been taking the amlodipine, we discussed about compliance with the medication, and likely statin medication, we discussed about decreasing alcohol intake, starting exercise routine   Key Anti-Hypertensive Meds          amLODIPine (NORVASC) 10 MG tablet (Taking)    Sig - Route:  Take 1 tablet by mouth daily - Oral    carvedilol (COREG) 12.5 MG tablet (Taking)    Class: Historical Med

## 2023-09-07 NOTE — PROGRESS NOTES
Chief Complaint   Patient presents with    Medication Refill        Elnita Eisenmenger is a 53 y.o. male who presents today for Medication Refill     He is here for follow-up of hypertension, he missed his follow-up visit last time,  He reports has not been taking the amlodipine for the last week, as he ran out, but he has been taking the medications prescribed by his nephrologist,  Has an appointment next Monday,  He completed his labs, showing stable creatinine, and normal CBC,  He reports occasionally forgetting the medications, he is good to take medications during the week, but not during the weekend,  He continues to drink 1-2 light beers daily,  Is not exercising, reports occasional dietary indiscretion,    Wt Readings from Last 3 Encounters:   09/07/23 217 lb 9.6 oz (98.7 kg)   02/01/23 217 lb 9.6 oz (98.7 kg)   01/04/23 216 lb 9.6 oz (98.2 kg)     Vitals:    09/07/23 1356   BP: (!) 150/80   Site: Left Upper Arm   Position: Sitting   Pulse: 84   Weight: 217 lb 9.6 oz (98.7 kg)   Height: 5' 6.05\" (1.678 m)        Assessment and plan:  1. Chronic kidney disease (CKD) stage G3b/A1, moderately decreased glomerular filtration rate (GFR) between 30-44 mL/min/1.73 square meter and albuminuria creatinine ratio less than 30 mg/g (HCC)  Assessment & Plan:   Monitored by specialist- no acute findings meriting change in the plan  2. Primary hypertension  Assessment & Plan:   Uncontrolled, continue current medications, medication adherence emphasized, lifestyle modifications recommended and Patient will follow-up with nephrology, his blood pressure today is not in optimal control, but has not been taking the amlodipine, we discussed about compliance with the medication, and likely statin medication, we discussed about decreasing alcohol intake, starting exercise routine   Key Anti-Hypertensive Meds            amLODIPine (NORVASC) 10 MG tablet (Taking)    Sig - Route:  Take 1 tablet by mouth daily - Oral

## 2023-09-07 NOTE — ASSESSMENT & PLAN NOTE
Discussed with patient that having obesity increases a person's risk of developing many health problems. Diabetes, High blood pressure, High cholesterol, Heart disease (including heart attacks), Stroke, Sleep apnea (a disorder in which you stop breathing for short periods while asleep), Asthma, Cancer  But even if weight loss is not possible, may reduce risk by:  Become more active - Many types of physical activity can help, including walking. You can start with a few minutes a day and add more as you get stronger and build up your endurance. Improve your diet - It is healthy to have regular meal times, eat smaller portions, and not skip meals. Avoid sweets and processed foods, and instead eat more vegetables and fruits.   Limit alcohol

## 2024-01-18 ENCOUNTER — TELEPHONE (OUTPATIENT)
Dept: INTERNAL MEDICINE CLINIC | Facility: CLINIC | Age: 49
End: 2024-01-18

## 2024-02-29 DIAGNOSIS — I10 PRIMARY HYPERTENSION: ICD-10-CM

## 2024-02-29 RX ORDER — AMLODIPINE BESYLATE 10 MG/1
10 TABLET ORAL DAILY
Qty: 90 TABLET | Refills: 1 | OUTPATIENT
Start: 2024-02-29

## 2024-04-08 ENCOUNTER — HOSPITAL ENCOUNTER (INPATIENT)
Age: 49
LOS: 4 days | Discharge: HOME | End: 2024-04-12
Attending: EMERGENCY MEDICINE | Admitting: STUDENT IN AN ORGANIZED HEALTH CARE EDUCATION/TRAINING PROGRAM
Payer: COMMERCIAL

## 2024-04-08 ENCOUNTER — APPOINTMENT (OUTPATIENT)
Dept: GENERAL RADIOLOGY | Age: 49
End: 2024-04-08
Payer: COMMERCIAL

## 2024-04-08 ENCOUNTER — OFFICE VISIT (OUTPATIENT)
Dept: INTERNAL MEDICINE CLINIC | Facility: CLINIC | Age: 49
End: 2024-04-08
Payer: COMMERCIAL

## 2024-04-08 VITALS
SYSTOLIC BLOOD PRESSURE: 140 MMHG | OXYGEN SATURATION: 99 % | BODY MASS INDEX: 33.04 KG/M2 | DIASTOLIC BLOOD PRESSURE: 90 MMHG | HEART RATE: 91 BPM | HEIGHT: 66 IN | WEIGHT: 205.6 LBS

## 2024-04-08 DIAGNOSIS — E78.5 DYSLIPIDEMIA: ICD-10-CM

## 2024-04-08 DIAGNOSIS — N18.31 STAGE 3A CHRONIC KIDNEY DISEASE (HCC): ICD-10-CM

## 2024-04-08 DIAGNOSIS — E11.8 DIABETES MELLITUS TYPE 2 WITH COMPLICATIONS (HCC): ICD-10-CM

## 2024-04-08 DIAGNOSIS — H53.8 BLURRED VISION: ICD-10-CM

## 2024-04-08 DIAGNOSIS — R73.03 PREDIABETES: ICD-10-CM

## 2024-04-08 DIAGNOSIS — I10 PRIMARY HYPERTENSION: ICD-10-CM

## 2024-04-08 DIAGNOSIS — R73.03 PREDIABETES: Primary | ICD-10-CM

## 2024-04-08 DIAGNOSIS — E11.00 HYPEROSMOLAR HYPERGLYCEMIC STATE (HHS) (HCC): Primary | ICD-10-CM

## 2024-04-08 PROBLEM — N17.9 AKI (ACUTE KIDNEY INJURY) (HCC): Status: ACTIVE | Noted: 2024-04-08

## 2024-04-08 PROBLEM — E11.65 HYPERGLYCEMIA DUE TO DIABETES MELLITUS (HCC): Status: ACTIVE | Noted: 2024-04-08

## 2024-04-08 PROBLEM — K22.70 BARRETT'S ESOPHAGUS WITHOUT DYSPLASIA: Status: ACTIVE | Noted: 2023-08-07

## 2024-04-08 LAB
ALBUMIN SERPL-MCNC: 4.1 G/DL (ref 3.5–5)
ALBUMIN SERPL-MCNC: 4.2 G/DL (ref 3.5–5)
ALBUMIN/GLOB SERPL: 0.9 (ref 0.4–1.6)
ALBUMIN/GLOB SERPL: 0.9 (ref 0.4–1.6)
ALP SERPL-CCNC: 143 U/L (ref 50–136)
ALP SERPL-CCNC: 146 U/L (ref 50–136)
ALT SERPL-CCNC: 43 U/L (ref 12–65)
ALT SERPL-CCNC: 43 U/L (ref 12–65)
ANION GAP SERPL CALC-SCNC: 8 MMOL/L (ref 2–11)
ANION GAP SERPL CALC-SCNC: 9 MMOL/L (ref 2–11)
APPEARANCE UR: CLEAR
AST SERPL-CCNC: 20 U/L (ref 15–37)
AST SERPL-CCNC: 20 U/L (ref 15–37)
BACTERIA URNS QL MICRO: 0 /HPF
BASE EXCESS BLDV CALC-SCNC: 3.7 MMOL/L
BASOPHILS # BLD: 0.1 K/UL (ref 0–0.2)
BASOPHILS NFR BLD: 1 % (ref 0–2)
BILIRUB SERPL-MCNC: 0.6 MG/DL (ref 0.2–1.1)
BILIRUB SERPL-MCNC: 0.6 MG/DL (ref 0.2–1.1)
BILIRUB UR QL: NEGATIVE
BUN SERPL-MCNC: 26 MG/DL (ref 6–23)
BUN SERPL-MCNC: 27 MG/DL (ref 6–23)
CALCIUM SERPL-MCNC: 10.6 MG/DL (ref 8.3–10.4)
CALCIUM SERPL-MCNC: 10.7 MG/DL (ref 8.3–10.4)
CHLORIDE SERPL-SCNC: 93 MMOL/L (ref 103–113)
CHLORIDE SERPL-SCNC: 93 MMOL/L (ref 103–113)
CHOLEST SERPL-MCNC: 295 MG/DL
CO2 SERPL-SCNC: 27 MMOL/L (ref 21–32)
CO2 SERPL-SCNC: 28 MMOL/L (ref 21–32)
COLOR UR: ABNORMAL
CREAT SERPL-MCNC: 2.5 MG/DL (ref 0.8–1.5)
CREAT SERPL-MCNC: 2.6 MG/DL (ref 0.8–1.5)
DIFFERENTIAL METHOD BLD: NORMAL
EOSINOPHIL # BLD: 0.1 K/UL (ref 0–0.8)
EOSINOPHIL NFR BLD: 1 % (ref 0.5–7.8)
ERYTHROCYTE [DISTWIDTH] IN BLOOD BY AUTOMATED COUNT: 12.2 % (ref 11.9–14.6)
EST. AVERAGE GLUCOSE BLD GHB EST-MCNC: 258 MG/DL
EST. AVERAGE GLUCOSE BLD GHB EST-MCNC: 260 MG/DL
GLOBULIN SER CALC-MCNC: 4.6 G/DL (ref 2.8–4.5)
GLOBULIN SER CALC-MCNC: 4.7 G/DL (ref 2.8–4.5)
GLUCOSE SERPL-MCNC: 832 MG/DL (ref 65–100)
GLUCOSE SERPL-MCNC: 939 MG/DL (ref 65–100)
GLUCOSE UR STRIP.AUTO-MCNC: 250 MG/DL
HBA1C MFR BLD: 10.6 % (ref 4.8–5.6)
HBA1C MFR BLD: 10.7 % (ref 4.8–5.6)
HCO3 BLDV-SCNC: 30.2 MMOL/L (ref 23–28)
HCT VFR BLD AUTO: 47.8 % (ref 41.1–50.3)
HDLC SERPL-MCNC: 41 MG/DL (ref 40–60)
HDLC SERPL: 7.2
HGB BLD-MCNC: 16.1 G/DL (ref 13.6–17.2)
HGB UR QL STRIP: ABNORMAL
IMM GRANULOCYTES # BLD AUTO: 0 K/UL (ref 0–0.5)
IMM GRANULOCYTES NFR BLD AUTO: 0 % (ref 0–5)
KETONES UR QL STRIP.AUTO: NEGATIVE MG/DL
LDLC SERPL CALC-MCNC: ABNORMAL MG/DL
LDLC SERPL DIRECT ASSAY-MCNC: 118 MG/DL
LEUKOCYTE ESTERASE UR QL STRIP.AUTO: NEGATIVE
LYMPHOCYTES # BLD: 1.4 K/UL (ref 0.5–4.6)
LYMPHOCYTES NFR BLD: 25 % (ref 13–44)
MCH RBC QN AUTO: 28.9 PG (ref 26.1–32.9)
MCHC RBC AUTO-ENTMCNC: 33.7 G/DL (ref 31.4–35)
MCV RBC AUTO: 85.7 FL (ref 82–102)
MONOCYTES # BLD: 0.6 K/UL (ref 0.1–1.3)
MONOCYTES NFR BLD: 11 % (ref 4–12)
NEUTS SEG # BLD: 3.5 K/UL (ref 1.7–8.2)
NEUTS SEG NFR BLD: 62 % (ref 43–78)
NITRITE UR QL STRIP.AUTO: NEGATIVE
NRBC # BLD: 0 K/UL (ref 0–0.2)
PCO2 BLDV: 49.9 MMHG (ref 41–51)
PH BLDV: 7.39 (ref 7.32–7.42)
PH UR STRIP: 5.5 (ref 5–9)
PLATELET # BLD AUTO: 252 K/UL (ref 150–450)
PMV BLD AUTO: 11.6 FL (ref 9.4–12.3)
PO2 BLDV: 31 MMHG
POTASSIUM SERPL-SCNC: 4.5 MMOL/L (ref 3.5–5.1)
POTASSIUM SERPL-SCNC: 4.5 MMOL/L (ref 3.5–5.1)
PROT SERPL-MCNC: 8.8 G/DL (ref 6.3–8.2)
PROT SERPL-MCNC: 8.8 G/DL (ref 6.3–8.2)
PROT UR STRIP-MCNC: 30 MG/DL
RBC # BLD AUTO: 5.58 M/UL (ref 4.23–5.6)
SAO2 % BLDV: 57.4 % (ref 65–88)
SERVICE CMNT-IMP: ABNORMAL
SODIUM SERPL-SCNC: 129 MMOL/L (ref 136–146)
SODIUM SERPL-SCNC: 129 MMOL/L (ref 136–146)
SP GR UR REFRACTOMETRY: 1.03 (ref 1–1.02)
SPECIMEN TYPE: ABNORMAL
TRIGL SERPL-MCNC: 831 MG/DL (ref 35–150)
TSH W FREE THYROID IF ABNORMAL: 0.84 UIU/ML (ref 0.36–3.74)
UROBILINOGEN UR QL STRIP.AUTO: 0.2 EU/DL (ref 0.2–1)
VLDLC SERPL CALC-MCNC: 166.2 MG/DL (ref 6–23)
WBC # BLD AUTO: 5.7 K/UL (ref 4.3–11.1)

## 2024-04-08 PROCEDURE — 83036 HEMOGLOBIN GLYCOSYLATED A1C: CPT

## 2024-04-08 PROCEDURE — 99285 EMERGENCY DEPT VISIT HI MDM: CPT

## 2024-04-08 PROCEDURE — 82803 BLOOD GASES ANY COMBINATION: CPT

## 2024-04-08 PROCEDURE — 1100000000 HC RM PRIVATE

## 2024-04-08 PROCEDURE — 2580000003 HC RX 258: Performed by: EMERGENCY MEDICINE

## 2024-04-08 PROCEDURE — 80053 COMPREHEN METABOLIC PANEL: CPT

## 2024-04-08 PROCEDURE — 6370000000 HC RX 637 (ALT 250 FOR IP): Performed by: EMERGENCY MEDICINE

## 2024-04-08 PROCEDURE — 81001 URINALYSIS AUTO W/SCOPE: CPT

## 2024-04-08 PROCEDURE — 82962 GLUCOSE BLOOD TEST: CPT

## 2024-04-08 PROCEDURE — 3077F SYST BP >= 140 MM HG: CPT | Performed by: INTERNAL MEDICINE

## 2024-04-08 PROCEDURE — 3080F DIAST BP >= 90 MM HG: CPT | Performed by: INTERNAL MEDICINE

## 2024-04-08 PROCEDURE — 96374 THER/PROPH/DIAG INJ IV PUSH: CPT

## 2024-04-08 PROCEDURE — 99214 OFFICE O/P EST MOD 30 MIN: CPT | Performed by: INTERNAL MEDICINE

## 2024-04-08 PROCEDURE — 85025 COMPLETE CBC W/AUTO DIFF WBC: CPT

## 2024-04-08 PROCEDURE — 71045 X-RAY EXAM CHEST 1 VIEW: CPT

## 2024-04-08 RX ORDER — BISACODYL 10 MG
10 SUPPOSITORY, RECTAL RECTAL DAILY PRN
Status: DISCONTINUED | OUTPATIENT
Start: 2024-04-08 | End: 2024-04-12 | Stop reason: HOSPADM

## 2024-04-08 RX ORDER — 0.9 % SODIUM CHLORIDE 0.9 %
1000 INTRAVENOUS SOLUTION INTRAVENOUS
Status: COMPLETED | OUTPATIENT
Start: 2024-04-08 | End: 2024-04-08

## 2024-04-08 RX ORDER — DEXTROSE AND SODIUM CHLORIDE 5; .45 G/100ML; G/100ML
INJECTION, SOLUTION INTRAVENOUS CONTINUOUS PRN
Status: DISCONTINUED | OUTPATIENT
Start: 2024-04-08 | End: 2024-04-10 | Stop reason: SDUPTHER

## 2024-04-08 RX ORDER — ATORVASTATIN CALCIUM 20 MG/1
10 TABLET, FILM COATED ORAL DAILY
Status: DISCONTINUED | OUTPATIENT
Start: 2024-04-09 | End: 2024-04-09

## 2024-04-08 RX ORDER — AMLODIPINE BESYLATE 10 MG/1
10 TABLET ORAL DAILY
Status: DISCONTINUED | OUTPATIENT
Start: 2024-04-09 | End: 2024-04-12 | Stop reason: HOSPADM

## 2024-04-08 RX ORDER — ATORVASTATIN CALCIUM 10 MG/1
TABLET, FILM COATED ORAL
Qty: 90 TABLET | Refills: 0 | Status: ON HOLD | OUTPATIENT
Start: 2024-04-08 | End: 2024-04-12 | Stop reason: HOSPADM

## 2024-04-08 RX ORDER — HYDRALAZINE HYDROCHLORIDE 20 MG/ML
10 INJECTION INTRAMUSCULAR; INTRAVENOUS EVERY 6 HOURS PRN
Status: DISCONTINUED | OUTPATIENT
Start: 2024-04-08 | End: 2024-04-12 | Stop reason: HOSPADM

## 2024-04-08 RX ORDER — ENOXAPARIN SODIUM 100 MG/ML
40 INJECTION SUBCUTANEOUS DAILY
Status: DISCONTINUED | OUTPATIENT
Start: 2024-04-09 | End: 2024-04-08

## 2024-04-08 RX ORDER — MAGNESIUM SULFATE IN WATER 40 MG/ML
2000 INJECTION, SOLUTION INTRAVENOUS PRN
Status: DISCONTINUED | OUTPATIENT
Start: 2024-04-08 | End: 2024-04-08

## 2024-04-08 RX ORDER — DEXTROSE AND SODIUM CHLORIDE 5; .45 G/100ML; G/100ML
INJECTION, SOLUTION INTRAVENOUS CONTINUOUS PRN
Status: DISCONTINUED | OUTPATIENT
Start: 2024-04-08 | End: 2024-04-12 | Stop reason: HOSPADM

## 2024-04-08 RX ORDER — MAGNESIUM SULFATE 1 G/100ML
1000 INJECTION INTRAVENOUS PRN
Status: DISCONTINUED | OUTPATIENT
Start: 2024-04-08 | End: 2024-04-08

## 2024-04-08 RX ORDER — SODIUM CHLORIDE 450 MG/100ML
INJECTION, SOLUTION INTRAVENOUS CONTINUOUS
Status: DISCONTINUED | OUTPATIENT
Start: 2024-04-08 | End: 2024-04-08

## 2024-04-08 RX ORDER — CARVEDILOL 12.5 MG/1
12.5 TABLET ORAL 2 TIMES DAILY
Status: DISCONTINUED | OUTPATIENT
Start: 2024-04-08 | End: 2024-04-09

## 2024-04-08 RX ORDER — PANTOPRAZOLE SODIUM 40 MG/1
40 TABLET, DELAYED RELEASE ORAL DAILY
Status: DISCONTINUED | OUTPATIENT
Start: 2024-04-09 | End: 2024-04-09

## 2024-04-08 RX ORDER — SODIUM CHLORIDE 450 MG/100ML
INJECTION, SOLUTION INTRAVENOUS CONTINUOUS
Status: DISCONTINUED | OUTPATIENT
Start: 2024-04-08 | End: 2024-04-09

## 2024-04-08 RX ORDER — LANOLIN ALCOHOL/MO/W.PET/CERES
100 CREAM (GRAM) TOPICAL DAILY
Status: DISCONTINUED | OUTPATIENT
Start: 2024-04-09 | End: 2024-04-09

## 2024-04-08 RX ORDER — POLYETHYLENE GLYCOL 3350 17 G/17G
17 POWDER, FOR SOLUTION ORAL DAILY PRN
Status: DISCONTINUED | OUTPATIENT
Start: 2024-04-08 | End: 2024-04-12 | Stop reason: HOSPADM

## 2024-04-08 RX ORDER — ESOMEPRAZOLE MAGNESIUM 20 MG/1
20 GRANULE, DELAYED RELEASE ORAL DAILY
COMMUNITY

## 2024-04-08 RX ORDER — POTASSIUM CHLORIDE 7.45 MG/ML
10 INJECTION INTRAVENOUS PRN
Status: DISCONTINUED | OUTPATIENT
Start: 2024-04-08 | End: 2024-04-08

## 2024-04-08 RX ORDER — 0.9 % SODIUM CHLORIDE 0.9 %
1000 INTRAVENOUS SOLUTION INTRAVENOUS ONCE
Status: COMPLETED | OUTPATIENT
Start: 2024-04-08 | End: 2024-04-08

## 2024-04-08 RX ADMIN — SODIUM CHLORIDE 1000 ML: 9 INJECTION, SOLUTION INTRAVENOUS at 20:56

## 2024-04-08 RX ADMIN — SODIUM CHLORIDE 10.82 UNITS/HR: 9 INJECTION, SOLUTION INTRAVENOUS at 22:49

## 2024-04-08 RX ADMIN — INSULIN HUMAN 10 UNITS: 100 INJECTION, SOLUTION PARENTERAL at 21:22

## 2024-04-08 RX ADMIN — SODIUM CHLORIDE 1000 ML: 9 INJECTION, SOLUTION INTRAVENOUS at 22:03

## 2024-04-08 SDOH — ECONOMIC STABILITY: HOUSING INSECURITY
IN THE LAST 12 MONTHS, WAS THERE A TIME WHEN YOU DID NOT HAVE A STEADY PLACE TO SLEEP OR SLEPT IN A SHELTER (INCLUDING NOW)?: NO

## 2024-04-08 SDOH — ECONOMIC STABILITY: INCOME INSECURITY: HOW HARD IS IT FOR YOU TO PAY FOR THE VERY BASICS LIKE FOOD, HOUSING, MEDICAL CARE, AND HEATING?: NOT HARD AT ALL

## 2024-04-08 SDOH — ECONOMIC STABILITY: FOOD INSECURITY: WITHIN THE PAST 12 MONTHS, YOU WORRIED THAT YOUR FOOD WOULD RUN OUT BEFORE YOU GOT MONEY TO BUY MORE.: NEVER TRUE

## 2024-04-08 SDOH — ECONOMIC STABILITY: FOOD INSECURITY: WITHIN THE PAST 12 MONTHS, THE FOOD YOU BOUGHT JUST DIDN'T LAST AND YOU DIDN'T HAVE MONEY TO GET MORE.: NEVER TRUE

## 2024-04-08 ASSESSMENT — PATIENT HEALTH QUESTIONNAIRE - PHQ9
1. LITTLE INTEREST OR PLEASURE IN DOING THINGS: NOT AT ALL
SUM OF ALL RESPONSES TO PHQ QUESTIONS 1-9: 0
2. FEELING DOWN, DEPRESSED OR HOPELESS: NOT AT ALL
SUM OF ALL RESPONSES TO PHQ QUESTIONS 1-9: 0
SUM OF ALL RESPONSES TO PHQ QUESTIONS 1-9: 0
SUM OF ALL RESPONSES TO PHQ9 QUESTIONS 1 & 2: 0
SUM OF ALL RESPONSES TO PHQ QUESTIONS 1-9: 0

## 2024-04-08 ASSESSMENT — ENCOUNTER SYMPTOMS
NAUSEA: 0
ABDOMINAL PAIN: 0
WHEEZING: 0
CHEST TIGHTNESS: 0
DIARRHEA: 0
PHOTOPHOBIA: 0
BACK PAIN: 0
SHORTNESS OF BREATH: 0
VOMITING: 0
ABDOMINAL DISTENTION: 0
COUGH: 0
SHORTNESS OF BREATH: 0
COLOR CHANGE: 0
RHINORRHEA: 0

## 2024-04-08 ASSESSMENT — PAIN - FUNCTIONAL ASSESSMENT: PAIN_FUNCTIONAL_ASSESSMENT: 0-10

## 2024-04-08 ASSESSMENT — LIFESTYLE VARIABLES
HOW OFTEN DO YOU HAVE A DRINK CONTAINING ALCOHOL: 4 OR MORE TIMES A WEEK
HOW MANY STANDARD DRINKS CONTAINING ALCOHOL DO YOU HAVE ON A TYPICAL DAY: 3 OR 4

## 2024-04-08 ASSESSMENT — PAIN SCALES - GENERAL: PAINLEVEL_OUTOF10: 0

## 2024-04-08 NOTE — PROGRESS NOTES
Chief Complaint   Patient presents with    Dizziness     Blurred vision, urination frequency, dry mouth.         Aleena Baumann is a 48 y.o. male who presents today for Dizziness (Blurred vision, urination frequency, dry mouth. )  He is here for an acute visit, describing blurry vision that is started this morning, but has been experiencing dry mouth, headaches on and off for the last couple weeks, he reports compliant with medication for hypertension, sometimes skipped his evening dose,  He is currently following with nephrology at least once a year, and also has follow-up with gastroenterology for possible Dorman's esophagus, is taking Nexium and Pepcid as needed,  He reports drinking plenty of fluids, has been trying to cut down the alcohol intake, but also has been drinking more juices, sodas for the last couple weeks,  He reports occasional headaches, denies any chest pain,        Wt Readings from Last 3 Encounters:   04/08/24 93.3 kg (205 lb 9.6 oz)   09/07/23 98.7 kg (217 lb 9.6 oz)   02/01/23 98.7 kg (217 lb 9.6 oz)     Vitals:    04/08/24 1047   BP: (!) 140/90   Site: Left Upper Arm   Position: Sitting   Pulse: 91   SpO2: 99%   Weight: 93.3 kg (205 lb 9.6 oz)   Height: 1.678 m (5' 6.05\")        Assessment and plan:  1. Prediabetes  -     Hemoglobin A1C; Future  -     Comprehensive Metabolic Panel; Future  -     TSH with Reflex; Future  2. Primary hypertension  3. Blurred vision  -     Hemoglobin A1C; Future  -     TSH with Reflex; Future  4. Dyslipidemia  -     atorvastatin (LIPITOR) 10 MG tablet; TAKE 1 TABLET (10 MG) BY MOUTH DAILY., Disp-90 tablet, R-0Normal  -     Lipid Panel; Future    I am concerned to elevated blood sugars being the cause of blurry vision, patient has been also advised to follow-up with ophthalmologist as soon as possible, we discussed about other possible causes like allergies, affecting vision, but because history of hyperglycemia in the past, hypertension, chronic

## 2024-04-09 LAB
ANION GAP SERPL CALC-SCNC: 2 MMOL/L (ref 2–11)
ANION GAP SERPL CALC-SCNC: 8 MMOL/L (ref 2–11)
BASOPHILS # BLD: 0 K/UL (ref 0–0.2)
BASOPHILS NFR BLD: 1 % (ref 0–2)
BUN SERPL-MCNC: 19 MG/DL (ref 6–23)
BUN SERPL-MCNC: 21 MG/DL (ref 6–23)
CALCIUM SERPL-MCNC: 9.2 MG/DL (ref 8.3–10.4)
CALCIUM SERPL-MCNC: 9.5 MG/DL (ref 8.3–10.4)
CHLORIDE SERPL-SCNC: 106 MMOL/L (ref 103–113)
CHLORIDE SERPL-SCNC: 109 MMOL/L (ref 103–113)
CO2 SERPL-SCNC: 26 MMOL/L (ref 21–32)
CO2 SERPL-SCNC: 28 MMOL/L (ref 21–32)
CREAT SERPL-MCNC: 1.6 MG/DL (ref 0.8–1.5)
CREAT SERPL-MCNC: 1.8 MG/DL (ref 0.8–1.5)
DIFFERENTIAL METHOD BLD: NORMAL
EKG ATRIAL RATE: 76 BPM
EKG DIAGNOSIS: NORMAL
EKG P AXIS: 69 DEGREES
EKG P-R INTERVAL: 184 MS
EKG Q-T INTERVAL: 398 MS
EKG QRS DURATION: 96 MS
EKG QTC CALCULATION (BAZETT): 447 MS
EKG R AXIS: 29 DEGREES
EKG T AXIS: 13 DEGREES
EKG VENTRICULAR RATE: 76 BPM
EOSINOPHIL # BLD: 0.1 K/UL (ref 0–0.8)
EOSINOPHIL NFR BLD: 2 % (ref 0.5–7.8)
ERYTHROCYTE [DISTWIDTH] IN BLOOD BY AUTOMATED COUNT: 12.3 % (ref 11.9–14.6)
GLUCOSE BLD STRIP.AUTO-MCNC: 209 MG/DL (ref 65–100)
GLUCOSE BLD STRIP.AUTO-MCNC: 247 MG/DL (ref 65–100)
GLUCOSE BLD STRIP.AUTO-MCNC: 253 MG/DL (ref 65–100)
GLUCOSE BLD STRIP.AUTO-MCNC: 260 MG/DL (ref 65–100)
GLUCOSE BLD STRIP.AUTO-MCNC: 271 MG/DL (ref 65–100)
GLUCOSE BLD STRIP.AUTO-MCNC: 282 MG/DL (ref 65–100)
GLUCOSE BLD STRIP.AUTO-MCNC: 283 MG/DL (ref 65–100)
GLUCOSE BLD STRIP.AUTO-MCNC: 297 MG/DL (ref 65–100)
GLUCOSE BLD STRIP.AUTO-MCNC: 330 MG/DL (ref 65–100)
GLUCOSE BLD STRIP.AUTO-MCNC: 376 MG/DL (ref 65–100)
GLUCOSE BLD STRIP.AUTO-MCNC: 393 MG/DL (ref 65–100)
GLUCOSE BLD STRIP.AUTO-MCNC: >600 MG/DL (ref 65–100)
GLUCOSE SERPL-MCNC: 280 MG/DL (ref 65–100)
GLUCOSE SERPL-MCNC: 302 MG/DL (ref 65–100)
HCT VFR BLD AUTO: 43.7 % (ref 41.1–50.3)
HGB BLD-MCNC: 14.6 G/DL (ref 13.6–17.2)
IMM GRANULOCYTES # BLD AUTO: 0 K/UL (ref 0–0.5)
IMM GRANULOCYTES NFR BLD AUTO: 0 % (ref 0–5)
LDLC SERPL DIRECT ASSAY-MCNC: 101 MG/DL
LYMPHOCYTES # BLD: 1.8 K/UL (ref 0.5–4.6)
LYMPHOCYTES NFR BLD: 32 % (ref 13–44)
MAGNESIUM SERPL-MCNC: 2.3 MG/DL (ref 1.8–2.4)
MAGNESIUM SERPL-MCNC: 2.6 MG/DL (ref 1.8–2.4)
MCH RBC QN AUTO: 28.5 PG (ref 26.1–32.9)
MCHC RBC AUTO-ENTMCNC: 33.4 G/DL (ref 31.4–35)
MCV RBC AUTO: 85.4 FL (ref 82–102)
MONOCYTES # BLD: 0.6 K/UL (ref 0.1–1.3)
MONOCYTES NFR BLD: 10 % (ref 4–12)
NEUTS SEG # BLD: 3.2 K/UL (ref 1.7–8.2)
NEUTS SEG NFR BLD: 55 % (ref 43–78)
NRBC # BLD: 0 K/UL (ref 0–0.2)
PHOSPHATE SERPL-MCNC: 3.2 MG/DL (ref 2.5–4.5)
PHOSPHATE SERPL-MCNC: 3.5 MG/DL (ref 2.5–4.5)
PLATELET # BLD AUTO: 222 K/UL (ref 150–450)
PMV BLD AUTO: 11.1 FL (ref 9.4–12.3)
POTASSIUM SERPL-SCNC: 3.5 MMOL/L (ref 3.5–5.1)
POTASSIUM SERPL-SCNC: 3.5 MMOL/L (ref 3.5–5.1)
RBC # BLD AUTO: 5.12 M/UL (ref 4.23–5.6)
SERVICE CMNT-IMP: ABNORMAL
SODIUM SERPL-SCNC: 139 MMOL/L (ref 136–146)
SODIUM SERPL-SCNC: 140 MMOL/L (ref 136–146)
TRIGL SERPL-MCNC: 636 MG/DL (ref 35–150)
TRIGL SERPL-MCNC: 695 MG/DL (ref 35–150)
WBC # BLD AUTO: 5.8 K/UL (ref 4.3–11.1)

## 2024-04-09 PROCEDURE — 93010 ELECTROCARDIOGRAM REPORT: CPT | Performed by: INTERNAL MEDICINE

## 2024-04-09 PROCEDURE — 83721 ASSAY OF BLOOD LIPOPROTEIN: CPT

## 2024-04-09 PROCEDURE — 84100 ASSAY OF PHOSPHORUS: CPT

## 2024-04-09 PROCEDURE — 6370000000 HC RX 637 (ALT 250 FOR IP): Performed by: HOSPITALIST

## 2024-04-09 PROCEDURE — 80048 BASIC METABOLIC PNL TOTAL CA: CPT

## 2024-04-09 PROCEDURE — 6370000000 HC RX 637 (ALT 250 FOR IP): Performed by: INTERNAL MEDICINE

## 2024-04-09 PROCEDURE — 1100000000 HC RM PRIVATE

## 2024-04-09 PROCEDURE — 85025 COMPLETE CBC W/AUTO DIFF WBC: CPT

## 2024-04-09 PROCEDURE — 36415 COLL VENOUS BLD VENIPUNCTURE: CPT

## 2024-04-09 PROCEDURE — 84478 ASSAY OF TRIGLYCERIDES: CPT

## 2024-04-09 PROCEDURE — 2580000003 HC RX 258: Performed by: INTERNAL MEDICINE

## 2024-04-09 PROCEDURE — 83735 ASSAY OF MAGNESIUM: CPT

## 2024-04-09 PROCEDURE — 93005 ELECTROCARDIOGRAM TRACING: CPT | Performed by: INTERNAL MEDICINE

## 2024-04-09 RX ORDER — INSULIN GLARGINE 100 [IU]/ML
10 INJECTION, SOLUTION SUBCUTANEOUS DAILY
Status: DISCONTINUED | OUTPATIENT
Start: 2024-04-09 | End: 2024-04-10

## 2024-04-09 RX ORDER — LANOLIN ALCOHOL/MO/W.PET/CERES
100 CREAM (GRAM) TOPICAL DAILY
Status: DISCONTINUED | OUTPATIENT
Start: 2024-04-10 | End: 2024-04-12 | Stop reason: HOSPADM

## 2024-04-09 RX ORDER — INSULIN LISPRO 100 [IU]/ML
0-4 INJECTION, SOLUTION INTRAVENOUS; SUBCUTANEOUS NIGHTLY
Status: DISCONTINUED | OUTPATIENT
Start: 2024-04-09 | End: 2024-04-12 | Stop reason: HOSPADM

## 2024-04-09 RX ORDER — ENOXAPARIN SODIUM 100 MG/ML
40 INJECTION SUBCUTANEOUS DAILY
Status: DISCONTINUED | OUTPATIENT
Start: 2024-04-09 | End: 2024-04-12 | Stop reason: HOSPADM

## 2024-04-09 RX ORDER — INSULIN GLARGINE 100 [IU]/ML
15 INJECTION, SOLUTION SUBCUTANEOUS NIGHTLY
Status: DISCONTINUED | OUTPATIENT
Start: 2024-04-09 | End: 2024-04-09

## 2024-04-09 RX ORDER — INSULIN LISPRO 100 [IU]/ML
0-4 INJECTION, SOLUTION INTRAVENOUS; SUBCUTANEOUS NIGHTLY
Status: DISCONTINUED | OUTPATIENT
Start: 2024-04-09 | End: 2024-04-09 | Stop reason: SDUPTHER

## 2024-04-09 RX ORDER — INSULIN LISPRO 100 [IU]/ML
0-8 INJECTION, SOLUTION INTRAVENOUS; SUBCUTANEOUS
Status: DISCONTINUED | OUTPATIENT
Start: 2024-04-09 | End: 2024-04-12 | Stop reason: HOSPADM

## 2024-04-09 RX ORDER — INSULIN LISPRO 100 [IU]/ML
0-16 INJECTION, SOLUTION INTRAVENOUS; SUBCUTANEOUS
Status: DISCONTINUED | OUTPATIENT
Start: 2024-04-09 | End: 2024-04-09 | Stop reason: SDUPTHER

## 2024-04-09 RX ORDER — CARVEDILOL 25 MG/1
25 TABLET ORAL 2 TIMES DAILY WITH MEALS
Status: DISCONTINUED | OUTPATIENT
Start: 2024-04-09 | End: 2024-04-12 | Stop reason: HOSPADM

## 2024-04-09 RX ORDER — INSULIN LISPRO 100 [IU]/ML
3 INJECTION, SOLUTION INTRAVENOUS; SUBCUTANEOUS
Status: DISCONTINUED | OUTPATIENT
Start: 2024-04-09 | End: 2024-04-10

## 2024-04-09 RX ADMIN — INSULIN LISPRO 3 UNITS: 100 INJECTION, SOLUTION INTRAVENOUS; SUBCUTANEOUS at 16:18

## 2024-04-09 RX ADMIN — PANTOPRAZOLE SODIUM 40 MG: 40 TABLET, DELAYED RELEASE ORAL at 08:31

## 2024-04-09 RX ADMIN — CARVEDILOL 12.5 MG: 25 TABLET, FILM COATED ORAL at 17:44

## 2024-04-09 RX ADMIN — INSULIN LISPRO 3 UNITS: 100 INJECTION, SOLUTION INTRAVENOUS; SUBCUTANEOUS at 11:26

## 2024-04-09 RX ADMIN — SODIUM CHLORIDE: 4.5 INJECTION, SOLUTION INTRAVENOUS at 00:34

## 2024-04-09 RX ADMIN — ATORVASTATIN CALCIUM 10 MG: 20 TABLET, FILM COATED ORAL at 08:30

## 2024-04-09 RX ADMIN — Medication 100 MG: at 08:30

## 2024-04-09 RX ADMIN — INSULIN GLARGINE 10 UNITS: 100 INJECTION, SOLUTION SUBCUTANEOUS at 09:22

## 2024-04-09 RX ADMIN — AMLODIPINE BESYLATE 10 MG: 10 TABLET ORAL at 08:30

## 2024-04-09 RX ADMIN — INSULIN LISPRO 4 UNITS: 100 INJECTION, SOLUTION INTRAVENOUS; SUBCUTANEOUS at 16:18

## 2024-04-09 RX ADMIN — CARVEDILOL 12.5 MG: 12.5 TABLET, FILM COATED ORAL at 08:30

## 2024-04-09 RX ADMIN — DEXTROSE AND SODIUM CHLORIDE: 5; 450 INJECTION, SOLUTION INTRAVENOUS at 04:05

## 2024-04-09 RX ADMIN — INSULIN LISPRO 4 UNITS: 100 INJECTION, SOLUTION INTRAVENOUS; SUBCUTANEOUS at 20:22

## 2024-04-09 RX ADMIN — CARVEDILOL 12.5 MG: 12.5 TABLET, FILM COATED ORAL at 00:35

## 2024-04-09 ASSESSMENT — PAIN SCALES - GENERAL
PAINLEVEL_OUTOF10: 0

## 2024-04-09 NOTE — PROGRESS NOTES
Hospitalist   Admit Date:  2024  8:26 PM   Name:  Aleena Baumann   Age:  48 y.o.  Sex:  male  :  1975   MRN:  [id number]   Room:  [address] [place] [address]    Presenting/Chief Complaint: Hyperglycemia     Reason(s) for Admission: Stage 3a chronic kidney disease (HCC) [N18.31]  Hyperglycemia due to diabetes mellitus (HCC) [E11.65]  Hyperosmolar hyperglycemic state (HHS) (HCC) [E11.00]     History of Present Illness:        48 y.o. male with medical history of pre diabetes, HTN, CKD3, HLP, BMI 31 evaluated with blurred vision, increased thirst and urination. Followed by PCP. Unable to read TV and computer screen today. Seen in office s/p labs and glucose 800s. Sent to ED. .  Creatinine 2.60  Received 10 Units IV insulin and placed on IV insulin drip  UA negative  CXR negative   Has missed taking PM coreg  BP elevated   Creatinine up to 16 from 1.5 range   Does drink 3 beers most days   Wife bedside Danii [phone]   FULL CODE     Today, asymptomatic, BG better controlled, vitals stable      Assessment & Plan:     1- HONK, new Dx of diabetes mellitus type 2, one parent is diabetic. Improved on HONK protocol.    2- GERTRUDE on CKD3, improved to baseline    3- Hypertension, not well controlled, meds advanced    4- Alcohol use, no withdrawal    Rx:  Lantus/ Humalog  DM education  Possibility of DC on OHG as pt is willing to follow DM diet       Dispo; home, likely tomorrow  Tx to medical floor       Objective:   Patient Vitals for the past 24 hrs:   Temp Pulse Resp BP SpO2   24 1130 98 °F (36.7 °C) 74 28 (!) 147/91 100 %   24 1100 -- 68 (!) 6 123/84 97 %   24 1030 -- 70 12 135/84 97 %   24 1000 -- 66 10 (!) 148/90 98 %   24 0930 -- 67 13 (!) 158/95 99 %   24 0900 -- 67 16 (!) 152/93 99 %   24 0830 -- 70 18 (!) 154/97 98 %   24 0800 -- 66 15 131/85 100 %   24 0730 97.9 °F (36.6 °C) 66 18 (!) 144/97 100 %   24 07 -- 61 12 (!) 140/84 97 %   24 0630 --  [email or web address]    Basic Metabolic Panel    Collection Time: 04/09/24  6:00 AM   Result Value Ref Range    Sodium 140 136 - 146 mmol/L    Potassium 3.5 3.5 - 5.1 mmol/L    Chloride 106 103 - 113 mmol/L    CO2 26 21 - 32 mmol/L    Anion Gap 8 2 - 11 mmol/L    Glucose 280 (H) 65 - 100 mg/dL    BUN 19 6 - 23 MG/DL    Creatinine 1.60 (H) 0.8 - 1.5 MG/DL    Est, Glom Filt Rate 53 (L) >60 ml/min/1.73m2    Calcium 9.5 8.3 - 10.4 MG/DL   Magnesium    Collection Time: 04/09/24  6:00 AM   Result Value Ref Range    Magnesium 2.3 1.8 - 2.4 mg/dL   Phosphorus    Collection Time: 04/09/24  6:00 AM   Result Value Ref Range    Phosphorus 3.5 2.5 - 4.5 MG/DL   CBC with Auto Differential    Collection Time: 04/09/24  6:00 AM   Result Value Ref Range    WBC 5.8 4.3 - 11.1 K/uL    RBC 5.12 4.23 - 5.6 M/uL    Hemoglobin 14.6 13.6 - 17.2 g/dL    Hematocrit 43.7 41.1 - 50.3 %    MCV 85.4 82 - 102 FL    MCH 28.5 26.1 - 32.9 PG    MCHC 33.4 31.4 - 35.0 g/dL    RDW 12.3 11.9 - 14.6 %    Platelets 222 150 - 450 K/uL    MPV 11.1 9.4 - 12.3 FL    nRBC 0.00 0.0 - 0.2 K/uL    Differential Type AUTOMATED      Neutrophils % 55 43 - 78 %    Lymphocytes % 32 13 - 44 %    Monocytes % 10 4.0 - 12.0 %    Eosinophils % 2 0.5 - 7.8 %    Basophils % 1 0.0 - 2.0 %    Immature Granulocytes % 0 0.0 - 5.0 %    Neutrophils Absolute 3.2 1.7 - 8.2 K/UL    Lymphocytes Absolute 1.8 0.5 - 4.6 K/UL    Monocytes Absolute 0.6 0.1 - 1.3 K/UL    Eosinophils Absolute 0.1 0.0 - 0.8 K/UL    Basophils Absolute 0.0 0.0 - 0.2 K/UL    Immature Granulocytes Absolute 0.0 0.0 - 0.5 K/UL   Triglyceride    Collection Time: 04/09/24  6:00 AM   Result Value Ref Range    Triglycerides 636 (H) 35 - 150 MG/DL   LDL Cholesterol, Direct    Collection Time: 04/09/24  6:00 AM   Result Value Ref Range    LDL Direct 101 (H) <100 mg/dl   POCT Glucose    Collection Time: 04/09/24  6:12 AM   Result Value Ref Range    POC Glucose 260 (H) 65 - 100 mg/dL    Performed by: Ceci

## 2024-04-09 NOTE — PROGRESS NOTES
TRANSFER - IN REPORT:    Verbal report received from Ginger MARTINES on Aleena Baumann  being received from ER for routine progression of patient care      Report consisted of patient's Situation, Background, Assessment and   Recommendations(SBAR).     Information from the following report(s) Nurse Handoff Report, ED Encounter Summary, ED SBAR, Adult Overview, Intake/Output, MAR, Recent Results, Med Rec Status, and Cardiac Rhythm SR  was reviewed with the receiving nurse.    Opportunity for questions and clarification was provided.      Assessment completed upon patient's arrival to unit and care assumed.

## 2024-04-09 NOTE — INTERDISCIPLINARY ROUNDS
Multi-D Rounds/Checklist (leapfrog):  Lines: can any be removed?: None      DVT Prophylaxis: Ordered  Vent: N/A  Nutrition Ordered/appropriate: Contraindicated- insulin drip  Can antibiotics or other drugs be stopped? N/A   Inpat Anti-Infectives (From admission, onward)      None          Consults needed: None  A: Is pain control adequate? (has PRNs? Stop drip?) Yes  B: Sedation break and SBT? N/A  C: Is sedation choice appropriate? N/A  D: Delirium/CAM-ICU? No  E: Mobility goals/appropriateness? Yes  F: Family update and plan? Wife is surrogate decision maker and is being updated daily by primary attending and nursing staff.    JERICHO Marcano - NP

## 2024-04-09 NOTE — PROGRESS NOTES
TRANSFER - OUT REPORT:    Verbal report given to CONRAD Martinez on Aleena Baumann  being transferred to [facility] room 2235 for routine progression of patient care       Report consisted of patient's Situation, Background, Assessment and   Recommendations(SBAR).     Information from the following report(s) Nurse Handoff Report, Adult Overview, Intake/Output, MAR, Recent Results, Med Rec Status, and Cardiac Rhythm NSR  was reviewed with the receiving nurse.           Lines:   Peripheral IV 04/08/24 Left Antecubital (Active)   Site Assessment Clean, dry & intact 04/09/24 1613   Line Status Blood return noted;Flushed 04/09/24 1613   Line Care Connections checked and tightened 04/09/24 1613   Phlebitis Assessment No symptoms 04/09/24 1613   Infiltration Assessment 0 04/09/24 1613   Alcohol Cap Used No 04/09/24 1613   Dressing Status Clean, dry & intact 04/09/24 1613   Dressing Type Transparent 04/09/24 1613   Dressing Intervention New 04/08/24 2030        Opportunity for questions and clarification was provided.      Patient transported with:  Registered Nurse

## 2024-04-09 NOTE — PROGRESS NOTES
Coreg was increased to 25 mg BID from 12.5 mg. When I went to administer this medication pt refused and doesn't feel like he needs his medication increased and feels BP was elevated due to not taking home dose appropriately. He agreed to take 12.5 mg coreg dose that he takes at home.

## 2024-04-09 NOTE — ED NOTES
TRANSFER - OUT REPORT:    Verbal report given to Olga MARTINES on Aleena Baumann  being transferred to [facility] for routine progression of patient care       Report consisted of patient's Situation, Background, Assessment and   Recommendations(SBAR).     Information from the following report(s) Nurse Handoff Report was reviewed with the receiving nurse.    Mora Fall Assessment:    Presents to emergency department  because of falls (Syncope, seizure, or loss of consciousness): No  Age > 70: No  Altered Mental Status, Intoxication with alcohol or substance confusion (Disorientation, impaired judgment, poor safety awaremess, or inability to follow instructions): No  Impaired Mobility: Ambulates or transfers with assistive devices or assistance; Unable to ambulate or transer.: No  Nursing Judgement: No          Lines:   Peripheral IV 04/08/24 Left Antecubital (Active)   Site Assessment Clean, dry & intact 04/08/24 2030   Line Status Blood return noted;Flushed 04/08/24 2030   Phlebitis Assessment No symptoms 04/08/24 2030   Infiltration Assessment 0 04/08/24 2030   Alcohol Cap Used No 04/08/24 2030   Dressing Status New dressing applied 04/08/24 2030   Dressing Type Transparent 04/08/24 2030   Dressing Intervention New 04/08/24 2030       Peripheral IV 04/08/24 Right;Anterior Forearm (Active)        Opportunity for questions and clarification was provided.      Patient transported with:  Monitor and Registered Nurse           Ginger Mckeon RN  04/08/24 [id number]

## 2024-04-09 NOTE — PROGRESS NOTES
Initial visit was made, emotional support and a spiritual presence were provided.     Wally Huynh MDIV, [facility]

## 2024-04-09 NOTE — RESULT ENCOUNTER NOTE
Critical labs were discussed with patient last night, patient was told to go to [facility] ER to be admitted until stabilization of his sugars and renal failure, will follow-up upon discharge

## 2024-04-09 NOTE — H&P
Hospitalist History and Physical   Admit Date:  2024  8:26 PM   Name:  Aleena Baumann   Age:  48 y.o.  Sex:  male  :  1975   MRN:  [id number]   Room:  [facility]/    Presenting/Chief Complaint: Hyperglycemia     Reason(s) for Admission: Hyperglycemia due to diabetes mellitus (HCC) [E11.65]     History of Present Illness:       Aleena Baumann is a 48 y.o. male with medical history of pre diabetes, HTN, CKD3, HLP, BMI 31 evaluated with blurred vision, increased thirst and urination. Followed by PCP. Unable to read TV and computer screen today. Seen in office s/p labs and glucose 800s. Sent to ED. .  Creatinine 2.60    Received 10 Units IV insulin and placed on IV insulin drip    UA negative  CXR negative     Has missed taking PM coreg  BP elevated     Creatinine up to 16 from 1.5 range     Does drink 3 beers most days     Wife bedside Danii [phone]   FULL CODE       Assessment & Plan:     Principal Problem:    Hyperglycemia due to diabetes mellitus (HCC)  Plan:   Admit ICU  IV insulin drip goal 200-300  1/4 NS 150cc/hr  Adjust fluids when BG < 250  NPO, ice chips   DM2 education             GERTRUDE on  Active Problems:    Chronic kidney disease (CKD) stage G3b/A1, moderately decreased glomerular filtration rate (GFR) between 30-44 mL/min/1.73 square meter and albuminuria creatinine ratio less than 30 mg/g (HCC)  Plan:   Hydrate and followup BMP               Dyslipidemia  Plan:   Taking statin           Hypertension  Plan:   resume coreg and norvasc  As needed IV hydralazine             Obesity (BMI 30-39.9)  Plan:   Needs modification            GERD without esophagitis  Plan:   PPI        ETOH use:  Thiamine  Monitor for any withdrawals       PT/OT evals and PPD ordered?  Not ordered; patient not expected to need rehab  Diet: Diet NPO Exceptions are: Sips of Water with Meds, Ice Chips  VTE prophylaxis: SCD's   Code status: Full Code            Patient is critically ill.  Without  YELLOW/STRAW      Appearance CLEAR      Specific Gravity, UA 1.035 (H) 1.001 - 1.023      pH, Urine 5.5 5.0 - 9.0      Protein, UA 30 (A) NEG mg/dL    Glucose,  mg/dL    Ketones, Urine Negative NEG mg/dL    Bilirubin Urine Negative NEG      Blood, Urine TRACE (A) NEG      Urobilinogen, Urine 0.2 0.2 - 1.0 EU/dL    Nitrite, Urine Negative NEG      Leukocyte Esterase, Urine Negative NEG      BACTERIA, URINE 0 0 /hpf       No results for input(s): \"COVID19\" in the last 72 hours.    XR CHEST PORTABLE    Result Date: 4/8/2024  Chest X-ray INDICATION: Hyperglycemia COMPARISON:  None TECHNIQUE: AP/PA view of the chest was obtained. FINDINGS: The lungs are clear. There are no infiltrates or effusions.  The heart size is normal.  The bony thorax is intact. Mild eventration of the right hemidiaphragm.     No acute findings in the chest         Signed:  Ann Anderson MD    Part of this note may have been written by using a voice dictation software.  The note has been proof read but may still contain some grammatical/other typographical errors.

## 2024-04-09 NOTE — CARE COORDINATION
Case Management Assessment  Initial Evaluation    Date/Time of Evaluation: 4/9/2024 10:47 AM  Assessment Completed by: Karina Hull RN    If patient is discharged prior to next notation, then this note serves as note for discharge by case management.    Patient Name: Aleena Baumann                   YOB: 1975  Diagnosis: Stage 3a chronic kidney disease (HCC) [N18.31]  Hyperglycemia due to diabetes mellitus (HCC) [E11.65]  Hyperosmolar hyperglycemic state (HHS) (HCC) [E11.00]                   Date / Time: 4/8/2024  8:26 PM    Patient Admission Status: Inpatient   Readmission Risk (Low < 19, Mod (19-27), High > 27): Readmission Risk Score: 6.9    Current PCP: Kenna Barraza MD  PCP verified by CM? (P) Yes    Chart Reviewed: Yes      History Provided by: (P) Patient  Patient Orientation: (P) Alert and Oriented    Patient Cognition: (P) Alert    Hospitalization in the last 30 days (Readmission):  No    If yes, Readmission Assessment in  Navigator will be completed.    Advance Directives:      Code Status: Full Code   Patient's Primary Decision Maker is: (P) Legal Next of Kin      Discharge Planning:    Patient lives with: (P) Spouse/Significant Other Type of Home: (P) House  Primary Care Giver: (P) Self  Patient Support Systems include: (P) Spouse/Significant Other, Children, Family Members   Current Financial resources: (P) Other (Comment) (BCBS)  Current community resources: (P) None  Current services prior to admission: (P) None            Current DME:              Type of Home Care services:  None    ADLS  Prior functional level: (P) Independent in ADLs/IADLs  Current functional level: (P) Assistance with the following:    PT AM-PAC:   /24  OT AM-PAC:   /24    Family can provide assistance at DC: (P) Yes  Would you like Case Management to discuss the discharge plan with any other family members/significant others, and if so, who? (P) Yes  Plans to Return to Present Housing: (P)  Yes  Other Identified Issues/Barriers to RETURNING to current housing: pending  Potential Assistance needed at discharge: N/A            Potential DME:  pending  Patient expects to discharge to: House  Plan for transportation at discharge:  pending    Financial    Payor: BCBS / Plan: BCBS OUT OF STATE / Product Type: *No Product type* /     Does insurance require precert for SNF: Yes    Potential assistance Purchasing Medications: (P) No  Meds-to-Beds request:  n/a      [id number] - [place], [place] - [address] -  [phone] - F [phone]  [address] [address]  Phone: [phone] Fax: [phone]      Notes:    Factors facilitating achievement of predicted outcomes: Family support, Motivated, Cooperative, and Pleasant    Barriers to discharge: pending medical treatment    Additional Case Management Notes: Chart reviewed and pt discussed in am IDR. Seen in CCU s/p admission CKD/hyperosmolar hyperglycemic state. Alert and oriented. Confirms demographics/screen. Wife and daughter live with pt. Independent of ADL's. Drives self. No current DME's, HH, or rehab. PCP and insurance confirmed. No needs identified for d/c at present. Aware CM to follow for any transition of care needs when stable.

## 2024-04-09 NOTE — ED TRIAGE NOTES
Pt reports his PCP told him to to the ED for possible new dx of DM with DKA. Pt c/o of blurry vision. Pt reports polyuria, polydipsia.   
Patient

## 2024-04-09 NOTE — PLAN OF CARE
Problem: Discharge Planning  Goal: Discharge to home or other facility with appropriate resources  4/9/2024 0802 by Neetu Coleman, RN  Outcome: Progressing  Flowsheets (Taken 4/9/2024 0730)  Discharge to home or other facility with appropriate resources:   Identify barriers to discharge with patient and caregiver   Arrange for needed discharge resources and transportation as appropriate   Arrange for interpreters to assist at discharge as needed   Identify discharge learning needs (meds, wound care, etc)   Refer to discharge planning if patient needs post-hospital services based on physician order or complex needs related to functional status, cognitive ability or social support system  4/9/2024 0135 by Olga Decker, RN  Outcome: Progressing

## 2024-04-09 NOTE — ED PROVIDER NOTES
Emergency Department Provider Note       PCP: Kenna Barraza MD   Age: 48 y.o.   Sex: male     DISPOSITION Admitted 04/08/2024 10:45:57 PM       ICD-10-CM    1. Hyperosmolar hyperglycemic state (HHS) (HCC)  E11.00       2. Stage 3a chronic kidney disease (HCC)  N18.31           Medical Decision Making     Blood sugar read high here in the ED.  Symptoms concerning for new onset type 2 diabetes rule out DKA, likely HHS.  VBG ordered upon arrival showing potassium of 5.2 normal pH and blood sugar greater than 700.  IV fluid bolus ordered.  Insulin bolus ordered due to elevated potassium.  Later normal anion gap was returned to normal CO2.  Insulin drip and HHS hyperglycemic orders placed by this physician.  Hospitalist consulted for admission.     1 or more acute illnesses that pose a threat to life or bodily function.   Drug therapy given requiring intensive monitoring for toxicity.  Shared medical decision making was utilized in creating the patients health plan today.    I independently ordered and reviewed each unique test.  I reviewed external records: provider visit note from PCP.  I reviewed external records: provider visit note from outside specialist.   The patients assessment required an independent historian: Wife/.  The reason they were needed is important historical information not provided by the patient.  I interpreted the X-rays chest x-ray shows no acute process, no infiltrate.    The patient was admitted and I have discussed patient management with the admitting provider.    Exclusion criteria - the patient is NOT to be included for SEP-1 Core Measure due to: 2+ SIRS criteria are not met   Critical care procedure note : 30 minutes of critical care time was performed in the emergency department. This was separate from any other procedures listed during the patients emergency department course. The failure to initiate these interventions on an urgent basis would likely have resulted in     Chest X-ray    INDICATION: Hyperglycemia    COMPARISON:  None    TECHNIQUE: AP/PA view of the chest was obtained.    FINDINGS: The lungs are clear. There are no infiltrates or effusions.  The heart  size is normal.  The bony thorax is intact. Mild eventration of the right  hemidiaphragm.      Impression    No acute findings in the chest     CBC with Auto Differential   Result Value Ref Range    WBC 5.7 4.3 - 11.1 K/uL    RBC 5.58 4.23 - 5.6 M/uL    Hemoglobin 16.1 13.6 - 17.2 g/dL    Hematocrit 47.8 41.1 - 50.3 %    MCV 85.7 82 - 102 FL    MCH 28.9 26.1 - 32.9 PG    MCHC 33.7 31.4 - 35.0 g/dL    RDW 12.2 11.9 - 14.6 %    Platelets 252 150 - 450 K/uL    MPV 11.6 9.4 - 12.3 FL    nRBC 0.00 0.0 - 0.2 K/uL    Differential Type AUTOMATED      Neutrophils % 62 43 - 78 %    Lymphocytes % 25 13 - 44 %    Monocytes % 11 4.0 - 12.0 %    Eosinophils % 1 0.5 - 7.8 %    Basophils % 1 0.0 - 2.0 %    Immature Granulocytes % 0 0.0 - 5.0 %    Neutrophils Absolute 3.5 1.7 - 8.2 K/UL    Lymphocytes Absolute 1.4 0.5 - 4.6 K/UL    Monocytes Absolute 0.6 0.1 - 1.3 K/UL    Eosinophils Absolute 0.1 0.0 - 0.8 K/UL    Basophils Absolute 0.1 0.0 - 0.2 K/UL    Immature Granulocytes Absolute 0.0 0.0 - 0.5 K/UL   CMP   Result Value Ref Range    Sodium 129 (L) 136 - 146 mmol/L    Potassium 4.5 3.5 - 5.1 mmol/L    Chloride 93 (L) 103 - 113 mmol/L    CO2 28 21 - 32 mmol/L    Anion Gap 8 2 - 11 mmol/L    Glucose 939 (HH) 65 - 100 mg/dL    BUN 27 (H) 6 - 23 MG/DL    Creatinine 2.60 (H) 0.8 - 1.5 MG/DL    Est, Glom Filt Rate 30 (L) >60 ml/min/1.73m2    Calcium 10.6 (H) 8.3 - 10.4 MG/DL    Total Bilirubin 0.6 0.2 - 1.1 MG/DL    ALT 43 12 - 65 U/L    AST 20 15 - 37 U/L    Alk Phosphatase 146 (H) 50 - 136 U/L    Total Protein 8.8 (H) 6.3 - 8.2 g/dL    Albumin 4.1 3.5 - 5.0 g/dL    Globulin 4.7 (H) 2.8 - 4.5 g/dL    Albumin/Globulin Ratio 0.9 0.4 - 1.6     Hemoglobin A1c   Result Value Ref Range    Hemoglobin A1C 10.7 (H) 4.8 - 5.6 %    Estimated Avg

## 2024-04-09 NOTE — PROGRESS NOTES
4 Eyes Skin Assessment     NAME:  Aleena Baumann  YOB: 1975  MEDICAL RECORD NUMBER:  [id number]    The patient is being assessed for  Transfer to New Unit    I agree that at least one RN has performed a thorough Head to Toe Skin Assessment on the patient. ALL assessment sites listed below have been assessed.      Areas assessed by both nurses:    Shoulders, Back, Chest, Arms, Elbows, Hands, Sacrum. Buttock, Coccyx, Ischium, and Legs. Feet and Heels        Does the Patient have a Wound? No noted wound(s)       Don Prevention initiated by RN: No  Wound Care Orders initiated by RN: No    Pressure Injury (Stage 3,4, Unstageable, DTI, NWPT, and Complex wounds) if present, place Wound referral order by RN under : No    New Ostomies, if present place, Ostomy referral order under : No     Nurse 1 eSignature: Electronically signed by Juan Crowell RN on 4/9/24 at 5:25 PM EDT    **SHARE this note so that the co-signing nurse can place an eSignature**    Nurse 2 eSignature: Electronically signed by DIEGO TERESA RN on 4/9/24 at 6:11 PM EDT

## 2024-04-09 NOTE — DIABETES MGMT
Patient admitted with hyperglycemia. Admitting blood glucose 393. hbA1c 10.7 (). Patient received Regular insulin IV 10 units and was started on insulin gtt. Blood glucose this morning was 247. Noted patient transitioned off insulin gtt with current regimen of Lantus 10 units, Humalog 3 units with meals, and Humalog correctional insulin. Creatinine 1.60. GFR 53. Patient seen for diabetes education.    Patient denies previous history of diabetes but states he noted on his lab work last year at the doctor that his glucose level was in prediabetes range.     Patient in bed, wife/sister/mother all present at bedside. Patient has a positive family history of diabetes. Educated regarding basic physiology of diabetes, as well as causes, signs and symptoms, and treatments for hypoglycemia and hyperglycemia. Described the effects of poor glycemic control and the development of long-term complications such as renal, eye, nerve, and cardiovascular disease. Patient denies numbness and tingling in feet but states he recently set up appointment with podiatrist. Patient encouraged to have foot exam at next PCP appointment. Described proper diabetic foot care and the importance of checking feet daily. Per patient they drink water in the mornings, occasional 2% milk with cereal, rarely coffee but if having a few sips he will use powdered creamer and sugar, lately sodas and sweet tea but typically doesn't like these. Reviewed effects of sweetened beverages on glycemic control and discussed alternative beverages to help improve glycemic control. Patient voices that they do drink alcoholic beverages. Educated regarding ADA recommendations regarding alcohol and diabetes. Per patient they typically eat 2 meals a day. Briefly discussed carbohydrates and effects on glucose along with plate method, plan for further diabetes education tomorrow regarding diet. Educated patient regarding the benefits of physical activity (as cleared by

## 2024-04-09 NOTE — PLAN OF CARE
Problem: Discharge Planning  Goal: Discharge to home or other facility with appropriate resources  4/9/2024 1724 by Juan Crowell RN  Outcome: Progressing  Flowsheets (Taken 4/9/2024 1715)  Discharge to home or other facility with appropriate resources: Identify barriers to discharge with patient and caregiver  4/9/2024 0802 by Neetu Coleman RN  Outcome: Progressing  Flowsheets (Taken 4/9/2024 0730)  Discharge to home or other facility with appropriate resources:   Identify barriers to discharge with patient and caregiver   Arrange for needed discharge resources and transportation as appropriate   Arrange for interpreters to assist at discharge as needed   Identify discharge learning needs (meds, wound care, etc)   Refer to discharge planning if patient needs post-hospital services based on physician order or complex needs related to functional status, cognitive ability or social support system     Problem: Pain  Goal: Verbalizes/displays adequate comfort level or baseline comfort level  4/9/2024 1724 by Juan Crowell RN  Outcome: Progressing  Flowsheets (Taken 4/9/2024 1715)  Verbalizes/displays adequate comfort level or baseline comfort level: Encourage patient to monitor pain and request assistance  4/9/2024 0802 by Neetu Coleman RN  Outcome: Progressing  Flowsheets (Taken 4/9/2024 0730)  Verbalizes/displays adequate comfort level or baseline comfort level: Encourage patient to monitor pain and request assistance

## 2024-04-10 LAB
ALBUMIN SERPL-MCNC: 3.1 G/DL (ref 3.5–5)
ALBUMIN/GLOB SERPL: 0.8 (ref 0.4–1.6)
ALP SERPL-CCNC: 87 U/L (ref 50–136)
ALT SERPL-CCNC: 38 U/L (ref 12–65)
ANION GAP SERPL CALC-SCNC: 7 MMOL/L (ref 2–11)
AST SERPL-CCNC: 24 U/L (ref 15–37)
BILIRUB SERPL-MCNC: 0.6 MG/DL (ref 0.2–1.1)
BUN SERPL-MCNC: 18 MG/DL (ref 6–23)
CALCIUM SERPL-MCNC: 8.8 MG/DL (ref 8.3–10.4)
CHLORIDE SERPL-SCNC: 105 MMOL/L (ref 103–113)
CO2 SERPL-SCNC: 25 MMOL/L (ref 21–32)
CREAT SERPL-MCNC: 1.6 MG/DL (ref 0.8–1.5)
ERYTHROCYTE [DISTWIDTH] IN BLOOD BY AUTOMATED COUNT: 12.1 % (ref 11.9–14.6)
GLOBULIN SER CALC-MCNC: 3.9 G/DL (ref 2.8–4.5)
GLUCOSE BLD STRIP.AUTO-MCNC: 265 MG/DL (ref 65–100)
GLUCOSE BLD STRIP.AUTO-MCNC: 303 MG/DL (ref 65–100)
GLUCOSE BLD STRIP.AUTO-MCNC: 303 MG/DL (ref 65–100)
GLUCOSE BLD STRIP.AUTO-MCNC: 360 MG/DL (ref 65–100)
GLUCOSE BLD STRIP.AUTO-MCNC: 413 MG/DL (ref 65–100)
GLUCOSE SERPL-MCNC: 300 MG/DL (ref 65–100)
HCT VFR BLD AUTO: 43.4 % (ref 41.1–50.3)
HGB BLD-MCNC: 14.7 G/DL (ref 13.6–17.2)
MCH RBC QN AUTO: 28.7 PG (ref 26.1–32.9)
MCHC RBC AUTO-ENTMCNC: 33.9 G/DL (ref 31.4–35)
MCV RBC AUTO: 84.6 FL (ref 82–102)
NRBC # BLD: 0 K/UL (ref 0–0.2)
PLATELET # BLD AUTO: 193 K/UL (ref 150–450)
PMV BLD AUTO: 11.4 FL (ref 9.4–12.3)
POTASSIUM SERPL-SCNC: 3.4 MMOL/L (ref 3.5–5.1)
PROT SERPL-MCNC: 7 G/DL (ref 6.3–8.2)
RBC # BLD AUTO: 5.13 M/UL (ref 4.23–5.6)
SERVICE CMNT-IMP: ABNORMAL
SODIUM SERPL-SCNC: 137 MMOL/L (ref 136–146)
WBC # BLD AUTO: 5.8 K/UL (ref 4.3–11.1)

## 2024-04-10 PROCEDURE — 36415 COLL VENOUS BLD VENIPUNCTURE: CPT

## 2024-04-10 PROCEDURE — 85027 COMPLETE CBC AUTOMATED: CPT

## 2024-04-10 PROCEDURE — 80053 COMPREHEN METABOLIC PANEL: CPT

## 2024-04-10 PROCEDURE — 1100000000 HC RM PRIVATE

## 2024-04-10 PROCEDURE — 6370000000 HC RX 637 (ALT 250 FOR IP): Performed by: STUDENT IN AN ORGANIZED HEALTH CARE EDUCATION/TRAINING PROGRAM

## 2024-04-10 PROCEDURE — 6370000000 HC RX 637 (ALT 250 FOR IP): Performed by: HOSPITALIST

## 2024-04-10 PROCEDURE — 82962 GLUCOSE BLOOD TEST: CPT

## 2024-04-10 RX ORDER — ATORVASTATIN CALCIUM 40 MG/1
40 TABLET, FILM COATED ORAL NIGHTLY
Status: DISCONTINUED | OUTPATIENT
Start: 2024-04-10 | End: 2024-04-12 | Stop reason: HOSPADM

## 2024-04-10 RX ORDER — HYDRALAZINE HYDROCHLORIDE 25 MG/1
25 TABLET, FILM COATED ORAL EVERY 12 HOURS SCHEDULED
Status: DISCONTINUED | OUTPATIENT
Start: 2024-04-10 | End: 2024-04-10

## 2024-04-10 RX ORDER — INSULIN LISPRO 100 [IU]/ML
5 INJECTION, SOLUTION INTRAVENOUS; SUBCUTANEOUS
Status: DISCONTINUED | OUTPATIENT
Start: 2024-04-10 | End: 2024-04-10

## 2024-04-10 RX ORDER — INSULIN GLARGINE 100 [IU]/ML
23 INJECTION, SOLUTION SUBCUTANEOUS DAILY
Status: DISCONTINUED | OUTPATIENT
Start: 2024-04-10 | End: 2024-04-10

## 2024-04-10 RX ORDER — IBUPROFEN 600 MG/1
1 TABLET ORAL PRN
Status: DISCONTINUED | OUTPATIENT
Start: 2024-04-10 | End: 2024-04-12 | Stop reason: HOSPADM

## 2024-04-10 RX ORDER — INSULIN GLARGINE 100 [IU]/ML
0.4 INJECTION, SOLUTION SUBCUTANEOUS DAILY
Status: DISCONTINUED | OUTPATIENT
Start: 2024-04-11 | End: 2024-04-11

## 2024-04-10 RX ORDER — DEXTROSE MONOHYDRATE 100 MG/ML
INJECTION, SOLUTION INTRAVENOUS CONTINUOUS PRN
Status: DISCONTINUED | OUTPATIENT
Start: 2024-04-10 | End: 2024-04-12 | Stop reason: HOSPADM

## 2024-04-10 RX ORDER — INSULIN LISPRO 100 [IU]/ML
12 INJECTION, SOLUTION INTRAVENOUS; SUBCUTANEOUS
Status: DISCONTINUED | OUTPATIENT
Start: 2024-04-10 | End: 2024-04-11

## 2024-04-10 RX ADMIN — AMLODIPINE BESYLATE 10 MG: 10 TABLET ORAL at 07:51

## 2024-04-10 RX ADMIN — CARVEDILOL 25 MG: 25 TABLET, FILM COATED ORAL at 07:51

## 2024-04-10 RX ADMIN — INSULIN LISPRO 8 UNITS: 100 INJECTION, SOLUTION INTRAVENOUS; SUBCUTANEOUS at 12:24

## 2024-04-10 RX ADMIN — CARVEDILOL 25 MG: 25 TABLET, FILM COATED ORAL at 17:34

## 2024-04-10 RX ADMIN — Medication 100 MG: at 07:51

## 2024-04-10 RX ADMIN — INSULIN HUMAN 10 UNITS: 100 INJECTION, SUSPENSION SUBCUTANEOUS at 14:29

## 2024-04-10 RX ADMIN — INSULIN LISPRO 3 UNITS: 100 INJECTION, SOLUTION INTRAVENOUS; SUBCUTANEOUS at 07:55

## 2024-04-10 RX ADMIN — INSULIN LISPRO 6 UNITS: 100 INJECTION, SOLUTION INTRAVENOUS; SUBCUTANEOUS at 17:30

## 2024-04-10 RX ADMIN — INSULIN GLARGINE 23 UNITS: 100 INJECTION, SOLUTION SUBCUTANEOUS at 10:31

## 2024-04-10 RX ADMIN — INSULIN LISPRO 5 UNITS: 100 INJECTION, SOLUTION INTRAVENOUS; SUBCUTANEOUS at 12:23

## 2024-04-10 RX ADMIN — INSULIN LISPRO 4 UNITS: 100 INJECTION, SOLUTION INTRAVENOUS; SUBCUTANEOUS at 20:24

## 2024-04-10 RX ADMIN — INSULIN LISPRO 12 UNITS: 100 INJECTION, SOLUTION INTRAVENOUS; SUBCUTANEOUS at 17:29

## 2024-04-10 RX ADMIN — ATORVASTATIN CALCIUM 40 MG: 40 TABLET, FILM COATED ORAL at 20:25

## 2024-04-10 RX ADMIN — INSULIN LISPRO 2 UNITS: 100 INJECTION, SOLUTION INTRAVENOUS; SUBCUTANEOUS at 07:52

## 2024-04-10 NOTE — DIABETES MGMT
Patient admitted with Hyperglycemia due to diabetes mellitus. Blood glucose ranged 247-376 yesterday with patient receiving Lantus 10 units and Humalog 14 units. Blood glucose this morning was 265. Creatinine 1.60. GFR 53. Reviewed patient current regimen: Lantus 10 units daily, Humalog 3 units with meals, and Humalog correctional insulin.  Patient would likely benefit from an increase in basal and prandial insulin to improve glycemic control.  Provider updated via [employer] regarding recommendations and patient glycemic control.

## 2024-04-10 NOTE — PROGRESS NOTES
Hospitalist Progress Note   Admit Date:  2024  8:26 PM   Name:  Aleena Baumann   Age:  48 y.o.  Sex:  male  :  1975   MRN:  [id number]   Room:  [facility]    Presenting/Chief Complaint: Hyperglycemia     Reason(s) for Admission: Stage 3a chronic kidney disease (HCC) [N18.31]  Hyperglycemia due to diabetes mellitus (HCC) [E11.65]  Hyperosmolar hyperglycemic state (HHS) (HCC) [E11.00]     Hospital Course:   Aleena Baumann is a 48 y.o. male with medical history of pre diabetes, HTN, CKD3, HLP, BMI 31 evaluated with blurred vision, increased thirst and urination.  He was seen by his PCP due to his symptoms, and was sent to the ED.  In the ED and blood glucose was 939.  Patient was admitted to the ICU for hypoglycemia and new onset diabetes.  He was started on insulin infusion and improved.  He has been downgraded to medical floors.      Subjective & 24hr Events:   Seen and examined at bedside this morning.  No acute events overnight.  Denies any complaints at this time.      Assessment & Plan:      Hyperglycemia due to diabetes mellitus (HCC)  Newly diagnosed diabetes   A1c: 10.7  Status post insulin infusion  On Lantus 37 units daily, Humalog 10 units with meals  On sliding scale with Accu-Cheks  Diabetes  consulted to evaluate    GERTRUDE on CKD  Cr: 2.5>>1.6  Baseline Cr: 1.5  Continue to monitor    Dyslipidemia  Lipid panel: Cholesterol 295, triglyceride 831, HDL 41, LDL unable to calculate  Started on atorvastatin 40 mg daily    Hypertension  On Coreg 25 mg twice daily, Amlodipine 10 mg daily      Anticipated Discharge Arrangements:   Home    PT/OT evals and PPD ordered?  Not ordered; patient not expected to need rehab  Diet:  ADULT DIET; Regular; 3 carb choices (45 gm/meal); does not eat pork or red meat  VTE prophylaxis: Lovenox  Code status: Full Code      Non-peripheral Lines and Tubes (if present):          Telemetry (if present):  Cardiac/Telemetry Monitor On: No        Hospital  Performed by: Ceci    POCT Glucose    Collection Time: 04/09/24  7:17 AM   Result Value Ref Range    POC Glucose 283 (H) 65 - 100 mg/dL    Performed by: Levon    POCT Glucose    Collection Time: 04/09/24  8:27 AM   Result Value Ref Range    POC Glucose 247 (H) 65 - 100 mg/dL    Performed by: Levon    POCT Glucose    Collection Time: 04/09/24  4:09 PM   Result Value Ref Range    POC Glucose 282 (H) 65 - 100 mg/dL    Performed by: Levon    POCT Glucose    Collection Time: 04/09/24  8:00 PM   Result Value Ref Range    POC Glucose 376 (H) 65 - 100 mg/dL    Performed by: Lenora    Comprehensive Metabolic Panel    Collection Time: 04/10/24  3:56 AM   Result Value Ref Range    Sodium 137 136 - 146 mmol/L    Potassium 3.4 (L) 3.5 - 5.1 mmol/L    Chloride 105 103 - 113 mmol/L    CO2 25 21 - 32 mmol/L    Anion Gap 7 2 - 11 mmol/L    Glucose 300 (H) 65 - 100 mg/dL    BUN 18 6 - 23 MG/DL    Creatinine 1.60 (H) 0.8 - 1.5 MG/DL    Est, Glom Filt Rate 53 (L) >60 ml/min/1.73m2    Calcium 8.8 8.3 - 10.4 MG/DL    Total Bilirubin 0.6 0.2 - 1.1 MG/DL    ALT 38 12 - 65 U/L    AST 24 15 - 37 U/L    Alk Phosphatase 87 50 - 136 U/L    Total Protein 7.0 6.3 - 8.2 g/dL    Albumin 3.1 (L) 3.5 - 5.0 g/dL    Globulin 3.9 2.8 - 4.5 g/dL    Albumin/Globulin Ratio 0.8 0.4 - 1.6     CBC    Collection Time: 04/10/24  3:56 AM   Result Value Ref Range    WBC 5.8 4.3 - 11.1 K/uL    RBC 5.13 4.23 - 5.6 M/uL    Hemoglobin 14.7 13.6 - 17.2 g/dL    Hematocrit 43.4 41.1 - 50.3 %    MCV 84.6 82 - 102 FL    MCH 28.7 26.1 - 32.9 PG    MCHC 33.9 31.4 - 35.0 g/dL    RDW 12.1 11.9 - 14.6 %    Platelets 193 150 - 450 K/uL    MPV 11.4 9.4 - 12.3 FL    nRBC 0.00 0.0 - 0.2 K/uL   POCT Glucose    Collection Time: 04/10/24  6:28 AM   Result Value Ref Range    POC Glucose 265 (H) 65 - 100 mg/dL    Performed by: Lenora    POCT Glucose    Collection Time: 04/10/24 11:15 AM   Result Value Ref Range    POC Glucose 360

## 2024-04-10 NOTE — DIABETES MGMT
Patient seen for follow up diabetes education. Wife at bedside.    Explained the importance of blood glucose monitoring to patient and how this will provide their primary care provider with more information to help them safely titrate their regimen. Recommended frequency of TID and to record in log book to take to primary care provider appointment. Demonstrated how to use a blood glucose meter, care of strips, and sharps disposal. Educated patient that all glucometers are similar but differ in small ways. Educated patient that they should try to get the glucometer covered by their insurance formulary to keep their costs down. Educated patient to seek out affordable glucometer options that exist at some stores or drug stores if they are ever uninsured. Reviewed target blood glucose goals per American Diabetes Association recommendations. Patient was able to return demonstrate correct use of meter. Patient will need prescription for glucometer kit, test strips, and lancets at discharge so that the patient may obtain the meter covered by their insurance.    Patient given educational material, \"Diabetes Self-Management: A Patient Teaching Guide\". Educated re: effects of carbohydrates on blood glucose, the \"plate method\" of healthy meal planning, basics of healthy meal plan, Consistent Carbohydrate Diet, discussed the basics of carb counting and how to read a nutrition label. Educated patient regarding the benefits of physical activity (as cleared by provider) on glycemic control.     Educated patient regarding current basal/bolus regimen of Lantus and Humalog including type of insulin, timing with meals, onset, duration of effect, and peak of insulin dose. Educated patient on the differences between prandial insulin and correctional insulin. Patient successful with practice insulin dose calculation using example blood glucose level and correctional insulin scale. Instructed patient to always seek guidance from their

## 2024-04-10 NOTE — PLAN OF CARE
Problem: Discharge Planning  Goal: Discharge to home or other facility with appropriate resources  4/9/2024 2209 by Andrei Grubbs, RN  Outcome: Progressing  Flowsheets (Taken 4/9/2024 2000)  Discharge to home or other facility with appropriate resources:   Identify barriers to discharge with patient and caregiver   Arrange for needed discharge resources and transportation as appropriate   Identify discharge learning needs (meds, wound care, etc)   Refer to discharge planning if patient needs post-hospital services based on physician order or complex needs related to functional status, cognitive ability or social support system  4/9/2024 1724 by Juan Crowell RN  Outcome: Progressing  Flowsheets (Taken 4/9/2024 1715)  Discharge to home or other facility with appropriate resources: Identify barriers to discharge with patient and caregiver     Problem: Pain  Goal: Verbalizes/displays adequate comfort level or baseline comfort level  4/9/2024 2209 by Andrei Grubbs, RN  Outcome: Progressing  Flowsheets (Taken 4/9/2024 1955)  Verbalizes/displays adequate comfort level or baseline comfort level:   Encourage patient to monitor pain and request assistance   Assess pain using appropriate pain scale  4/9/2024 1724 by Juan Crowell RN  Outcome: Progressing  Flowsheets (Taken 4/9/2024 1715)  Verbalizes/displays adequate comfort level or baseline comfort level: Encourage patient to monitor pain and request assistance

## 2024-04-10 NOTE — PLAN OF CARE
Patient has remained A&O x 4. VS stable.     -blood sugars improving, (dinner blood sugar taken after pt started eating).  -pt self administered 2 doses of humalog today under RN supervision.   -pt requesting to be on medication for his GERD, takes nexium at home.  Dr. Rodríguez notified and protonix ordered. - Patient educated on new medication alogliptin for DM.   -refused lovenox- has been ambulating in little.    Patient rounded on hourly by myself or patient assistant and encouraged to call with any needs. No signs of distress noted. Bed low, locked, call bell within reach.   BSSR given to CONRAD Hines RN    Problem: Discharge Planning  Goal: Discharge to home or other facility with appropriate resources  Outcome: Progressing  Flowsheets (Taken 4/10/2024 0804 by Yisel Fraire, RN)  Discharge to home or other facility with appropriate resources:   Identify barriers to discharge with patient and caregiver   Arrange for needed discharge resources and transportation as appropriate   Identify discharge learning needs (meds, wound care, etc)     Problem: Pain  Goal: Verbalizes/displays adequate comfort level or baseline comfort level  Outcome: Progressing

## 2024-04-10 NOTE — ICUWATCH
RRT Clinical Rounding Nurse Progress Report      SUBJECTIVE: Patient assessed secondary to transfer from critical care.      Vitals:    04/09/24 1702 04/09/24 1955 04/09/24 2315 04/10/24 0310   BP: (!) 137/95 (!) 133/92 (!) 147/97 (!) 139/95   Pulse: 74 75 66 60   Resp: 18 16 16 16   Temp: 98.2 °F (36.8 °C) 98.5 °F (36.9 °C) 97.6 °F (36.4 °C) 97.8 °F (36.6 °C)   TempSrc: Oral Oral Axillary Oral   SpO2: 97% 99% 100% 99%   Weight:       Height:            DETERIORATION INDEX SCORE: 16    ASSESSMENT:  Pt in bed resting quietly on RA. Respirations even and unlabored. No needs expressed at this time. Last BGL= 376. Recent labs/notes/vitals reviewed and pt discussed with primary RN.    PLAN:  Will follow per RRT Clinical Rounding Program protocol.    Constanza Hurt RN  [facility]: [phone]  [facility]: [phone]

## 2024-04-10 NOTE — PROGRESS NOTES
TRANSFER - IN REPORT:    Verbal report received from CONRAD Rodriguez on Aleena Baumann  being received from CV step-down for routine progression of patient care      Report consisted of patient's Situation, Background, Assessment and   Recommendations(SBAR).     Information from the following report(s) Nurse Handoff Report, Recent Results, Cardiac Rhythm NSR, and Neuro Assessment was reviewed with the receiving nurse.    Opportunity for questions and clarification was provided.      Assessment completed upon patient's arrival to unit and care assumed.

## 2024-04-10 NOTE — CARE COORDINATION
Chart screened by CM for d/c planning.  On 4/10 pt was transferred from CCU room 3305 briefly to room 2235 then to 504 for progression of care.  BG still high.  Diabetic educator following.  There have not been PT/OT consults ordered.  Anticipated dispo: return home once medically stable.  CM will continue to follow.  LOS = 1 day

## 2024-04-10 NOTE — ICUWATCH
RRT Clinical Rounding Nurse Update    Vitals:    04/09/24 1702 04/09/24 1955 04/09/24 2315 04/10/24 0310   BP: (!) 137/95 (!) 133/92 (!) 147/97 (!) 139/95   Pulse: 74 75 66 60   Resp: 18 16 16 16   Temp: 98.2 °F (36.8 °C) 98.5 °F (36.9 °C) 97.6 °F (36.4 °C) 97.8 °F (36.6 °C)   TempSrc: Oral Oral Axillary Oral   SpO2: 97% 99% 100% 99%   Weight:       Height:            DETERIORATION INDEX SCORE: 17    ASSESSMENT:  Previous outreach assessment was reviewed. There have been no significant changes since previous assessment. Last VSB=389.    PLAN:  Will discharge from RRT Clinical Rounding Program per protocol. Please call if needed.    Constanza uHrt RN  [place]: [phone]  [place]: [phone]

## 2024-04-11 LAB
ANION GAP SERPL CALC-SCNC: 4 MMOL/L (ref 2–11)
BUN SERPL-MCNC: 20 MG/DL (ref 6–23)
CALCIUM SERPL-MCNC: 9.1 MG/DL (ref 8.3–10.4)
CHLORIDE SERPL-SCNC: 103 MMOL/L (ref 103–113)
CO2 SERPL-SCNC: 27 MMOL/L (ref 21–32)
CREAT SERPL-MCNC: 1.5 MG/DL (ref 0.8–1.5)
ERYTHROCYTE [DISTWIDTH] IN BLOOD BY AUTOMATED COUNT: 12.1 % (ref 11.9–14.6)
GLUCOSE BLD STRIP.AUTO-MCNC: 157 MG/DL (ref 65–100)
GLUCOSE BLD STRIP.AUTO-MCNC: 183 MG/DL (ref 65–100)
GLUCOSE BLD STRIP.AUTO-MCNC: 208 MG/DL (ref 65–100)
GLUCOSE BLD STRIP.AUTO-MCNC: 211 MG/DL (ref 65–100)
GLUCOSE SERPL-MCNC: 195 MG/DL (ref 65–100)
HCT VFR BLD AUTO: 43.1 % (ref 41.1–50.3)
HGB BLD-MCNC: 14.9 G/DL (ref 13.6–17.2)
MAGNESIUM SERPL-MCNC: 2.1 MG/DL (ref 1.8–2.4)
MCH RBC QN AUTO: 29 PG (ref 26.1–32.9)
MCHC RBC AUTO-ENTMCNC: 34.6 G/DL (ref 31.4–35)
MCV RBC AUTO: 83.9 FL (ref 82–102)
NRBC # BLD: 0 K/UL (ref 0–0.2)
PLATELET # BLD AUTO: 204 K/UL (ref 150–450)
PMV BLD AUTO: 11.4 FL (ref 9.4–12.3)
POTASSIUM SERPL-SCNC: 3.5 MMOL/L (ref 3.5–5.1)
RBC # BLD AUTO: 5.14 M/UL (ref 4.23–5.6)
SERVICE CMNT-IMP: ABNORMAL
SODIUM SERPL-SCNC: 134 MMOL/L (ref 136–146)
WBC # BLD AUTO: 5.2 K/UL (ref 4.3–11.1)

## 2024-04-11 PROCEDURE — 6370000000 HC RX 637 (ALT 250 FOR IP): Performed by: HOSPITALIST

## 2024-04-11 PROCEDURE — 85027 COMPLETE CBC AUTOMATED: CPT

## 2024-04-11 PROCEDURE — 83735 ASSAY OF MAGNESIUM: CPT

## 2024-04-11 PROCEDURE — 2580000003 HC RX 258: Performed by: HOSPITALIST

## 2024-04-11 PROCEDURE — 1100000000 HC RM PRIVATE

## 2024-04-11 PROCEDURE — 36415 COLL VENOUS BLD VENIPUNCTURE: CPT

## 2024-04-11 PROCEDURE — 6370000000 HC RX 637 (ALT 250 FOR IP): Performed by: STUDENT IN AN ORGANIZED HEALTH CARE EDUCATION/TRAINING PROGRAM

## 2024-04-11 PROCEDURE — 80048 BASIC METABOLIC PNL TOTAL CA: CPT

## 2024-04-11 PROCEDURE — 82962 GLUCOSE BLOOD TEST: CPT

## 2024-04-11 RX ORDER — POTASSIUM CHLORIDE 20 MEQ/1
40 TABLET, EXTENDED RELEASE ORAL
Status: COMPLETED | OUTPATIENT
Start: 2024-04-11 | End: 2024-04-11

## 2024-04-11 RX ORDER — GLIPIZIDE 5 MG/1
5 TABLET ORAL
Status: DISCONTINUED | OUTPATIENT
Start: 2024-04-11 | End: 2024-04-11

## 2024-04-11 RX ORDER — ALOGLIPTIN 12.5 MG/1
12.5 TABLET, FILM COATED ORAL DAILY
Status: DISCONTINUED | OUTPATIENT
Start: 2024-04-11 | End: 2024-04-12 | Stop reason: HOSPADM

## 2024-04-11 RX ORDER — INSULIN LISPRO 100 [IU]/ML
10 INJECTION, SOLUTION INTRAVENOUS; SUBCUTANEOUS
Status: DISCONTINUED | OUTPATIENT
Start: 2024-04-11 | End: 2024-04-12 | Stop reason: HOSPADM

## 2024-04-11 RX ORDER — LOSARTAN POTASSIUM 25 MG/1
25 TABLET ORAL DAILY
Status: DISCONTINUED | OUTPATIENT
Start: 2024-04-12 | End: 2024-04-12 | Stop reason: HOSPADM

## 2024-04-11 RX ORDER — EZETIMIBE 10 MG/1
10 TABLET ORAL NIGHTLY
Status: DISCONTINUED | OUTPATIENT
Start: 2024-04-11 | End: 2024-04-12 | Stop reason: HOSPADM

## 2024-04-11 RX ORDER — SODIUM CHLORIDE, SODIUM LACTATE, POTASSIUM CHLORIDE, AND CALCIUM CHLORIDE .6; .31; .03; .02 G/100ML; G/100ML; G/100ML; G/100ML
1000 INJECTION, SOLUTION INTRAVENOUS ONCE
Status: COMPLETED | OUTPATIENT
Start: 2024-04-11 | End: 2024-04-11

## 2024-04-11 RX ORDER — INSULIN GLARGINE 100 [IU]/ML
28 INJECTION, SOLUTION SUBCUTANEOUS DAILY
Status: DISCONTINUED | OUTPATIENT
Start: 2024-04-11 | End: 2024-04-12 | Stop reason: HOSPADM

## 2024-04-11 RX ADMIN — SODIUM CHLORIDE, POTASSIUM CHLORIDE, SODIUM LACTATE AND CALCIUM CHLORIDE 1000 ML: 600; 310; 30; 20 INJECTION, SOLUTION INTRAVENOUS at 13:08

## 2024-04-11 RX ADMIN — INSULIN LISPRO 2 UNITS: 100 INJECTION, SOLUTION INTRAVENOUS; SUBCUTANEOUS at 17:58

## 2024-04-11 RX ADMIN — POTASSIUM CHLORIDE 40 MEQ: 1500 TABLET, EXTENDED RELEASE ORAL at 08:35

## 2024-04-11 RX ADMIN — Medication 100 MG: at 08:05

## 2024-04-11 RX ADMIN — ATORVASTATIN CALCIUM 40 MG: 40 TABLET, FILM COATED ORAL at 20:19

## 2024-04-11 RX ADMIN — INSULIN GLARGINE 28 UNITS: 100 INJECTION, SOLUTION SUBCUTANEOUS at 08:11

## 2024-04-11 RX ADMIN — ALOGLIPTIN 12.5 MG: 12.5 TABLET, FILM COATED ORAL at 08:35

## 2024-04-11 RX ADMIN — AMLODIPINE BESYLATE 10 MG: 10 TABLET ORAL at 08:05

## 2024-04-11 RX ADMIN — INSULIN LISPRO 2 UNITS: 100 INJECTION, SOLUTION INTRAVENOUS; SUBCUTANEOUS at 12:51

## 2024-04-11 RX ADMIN — INSULIN LISPRO 10 UNITS: 100 INJECTION, SOLUTION INTRAVENOUS; SUBCUTANEOUS at 12:50

## 2024-04-11 RX ADMIN — INSULIN LISPRO 10 UNITS: 100 INJECTION, SOLUTION INTRAVENOUS; SUBCUTANEOUS at 17:57

## 2024-04-11 RX ADMIN — INSULIN LISPRO 10 UNITS: 100 INJECTION, SOLUTION INTRAVENOUS; SUBCUTANEOUS at 08:11

## 2024-04-11 RX ADMIN — CARVEDILOL 25 MG: 25 TABLET, FILM COATED ORAL at 08:06

## 2024-04-11 RX ADMIN — POTASSIUM CHLORIDE 40 MEQ: 1500 TABLET, EXTENDED RELEASE ORAL at 12:50

## 2024-04-11 RX ADMIN — CARVEDILOL 25 MG: 25 TABLET, FILM COATED ORAL at 18:00

## 2024-04-11 NOTE — DIABETES MGMT
Patient admitted with hyperglycemia. Blood glucose ranged 265-413 yesterday with patient receiving Lantus 23 units, Humalog 40 units, and NPH 10 units. Blood glucose this morning was 183. Creatinine 1.50. GFR 57. Reviewed patient current regimen: Lantus 28 units daily, Humalog 10 units with meals, Humalog correctional insulin, Alogliptin 12.5mg daily.    Patient seen for follow up diabetes education. Reviewed current regimen. Educated patient regarding current basal/bolus regimen of Lantus 28 units daily and Humalog 10 units with meals plus correctional insulin including type of insulin, timing with meals, onset, duration of effect, and peak of insulin dose. Reviewed with patient on differences between prandial insulin and correctional insulin. Patient successful with practice insulin dose calculation using example blood glucose level and correctional insulin scale. Instructed patient to always seek guidance from their primary care provider about adjusting their insulin doses and not to adjust them on their own as this could negatively impact their glycemic control or result in hypoglycemia. Reviewed basics of diabetic diet. Reviewed target blood glucose levels. Reviewed hypoglycemia with patient.    Patient will need prescription for glucometer kit, test strips, and lancets at discharge so that the patient may obtain the meter covered by their insurance.     Patient prefers insulin pens. Patient will need prescription for insulin pens and pen needles at discharge.     Encouraged compliance with discharge regimen. Encouraged patient to continue to work on lifestyle modifications and to follow up with primary care provider for further titration of regimen. Patient verbalized understanding and voices no further questions regarding diabetes management.

## 2024-04-11 NOTE — PLAN OF CARE
Problem: Discharge Planning  Goal: Discharge to home or other facility with appropriate resources  4/11/2024 0215 by Azucena Chou, RN  Outcome: Progressing  4/10/2024 1818 by Sharon Gonzalez, RN  Outcome: Progressing  Flowsheets (Taken 4/10/2024 0804 by Yisel Fraire, RN)  Discharge to home or other facility with appropriate resources:   Identify barriers to discharge with patient and caregiver   Arrange for needed discharge resources and transportation as appropriate   Identify discharge learning needs (meds, wound care, etc)     Problem: Pain  Goal: Verbalizes/displays adequate comfort level or baseline comfort level  4/11/2024 0215 by Azucena Chou, RN  Outcome: Progressing  4/10/2024 1818 by Sharon Gonzalez, RN  Outcome: Progressing

## 2024-04-11 NOTE — PROGRESS NOTES
Hospitalist Progress Note   Admit Date:  2024  8:26 PM   Name:  Aleena Baumann   Age:  48 y.o.  Sex:  male  :  1975   MRN:  [id number]   Room:  [facility]    Presenting/Chief Complaint: Hyperglycemia     Reason(s) for Admission: Stage 3a chronic kidney disease (HCC) [N18.31]  Hyperglycemia due to diabetes mellitus (HCC) [E11.65]  Hyperosmolar hyperglycemic state (HHS) (HCC) [E11.00]     Hospital Course:   Aleena Baumann is a 48 y.o. male with medical history of pre diabetes, HTN, CKD3, HLP, BMI 31 evaluated with blurred vision, increased thirst and urination.  He was seen by his PCP due to his symptoms, and was sent to the ED.  In the ED and blood glucose was 939.  Patient was admitted to the ICU for hyperglycemia and new onset diabetes.  He was started on insulin infusion and improved.  He has been downgraded to medical floors.      Subjective & 24hr Events:   Patient seen at bedside, resting in bed comfortably.  He is alert and awake, AOx3, on room air.  Patient reports feeling better overall, denies any chest pain, headache, nausea, vomiting, dry mouth, excessive urination.  No events overnight reported by nursing staff.    ROS:  10 point review system is negative except for what mentioned above.        Assessment & Plan:      Hyperglycemia due to diabetes mellitus (HCC)  Newly diagnosed diabetes   -  DKA has resolved  A1c: 10.7  Adjusting insulin regimen today: Decreased Lantus from 37 units to 28 units subcu daily, decreased Humalog from 12 units to 10 units 3 times daily AC.  Added Nesina 12.5 mg p.o. daily.  Continue glucose checks q. ACH S.  Diabetes educator has met with the patient and counseled about lifestyle modification and dietary changes.    GERTRUDE on CKD  -  Creatinine down to 1.5 from 1.6.  Creatinine was 2.5 on admission.  Continue to monitor  Will give 1 L bolus of LR today, repeat BMP in a.m.    Dyslipidemia  Lipid panel: Cholesterol 295, triglyceride 831, HDL 41, LDL  >60 ml/min/1.73m2    Calcium 8.8 8.3 - 10.4 MG/DL    Total Bilirubin 0.6 0.2 - 1.1 MG/DL    ALT 38 12 - 65 U/L    AST 24 15 - 37 U/L    Alk Phosphatase 87 50 - 136 U/L    Total Protein 7.0 6.3 - 8.2 g/dL    Albumin 3.1 (L) 3.5 - 5.0 g/dL    Globulin 3.9 2.8 - 4.5 g/dL    Albumin/Globulin Ratio 0.8 0.4 - 1.6     CBC    Collection Time: 04/10/24  3:56 AM   Result Value Ref Range    WBC 5.8 4.3 - 11.1 K/uL    RBC 5.13 4.23 - 5.6 M/uL    Hemoglobin 14.7 13.6 - 17.2 g/dL    Hematocrit 43.4 41.1 - 50.3 %    MCV 84.6 82 - 102 FL    MCH 28.7 26.1 - 32.9 PG    MCHC 33.9 31.4 - 35.0 g/dL    RDW 12.1 11.9 - 14.6 %    Platelets 193 150 - 450 K/uL    MPV 11.4 9.4 - 12.3 FL    nRBC 0.00 0.0 - 0.2 K/uL   POCT Glucose    Collection Time: 04/10/24  6:28 AM   Result Value Ref Range    POC Glucose 265 (H) 65 - 100 mg/dL    Performed by: Lenora    POCT Glucose    Collection Time: 04/10/24 11:15 AM   Result Value Ref Range    POC Glucose 360 (H) 65 - 100 mg/dL    Performed by: Daniel    POCT Glucose    Collection Time: 04/10/24  1:36 PM   Result Value Ref Range    POC Glucose 413 (H) 65 - 100 mg/dL    Performed by: Silvana    POCT Glucose    Collection Time: 04/10/24  4:00 PM   Result Value Ref Range    POC Glucose 303 (H) 65 - 100 mg/dL    Performed by: Debby    POCT Glucose    Collection Time: 04/10/24  8:17 PM   Result Value Ref Range    POC Glucose 303 (H) 65 - 100 mg/dL    Performed by: Lina    CBC    Collection Time: 04/11/24  6:47 AM   Result Value Ref Range    WBC 5.2 4.3 - 11.1 K/uL    RBC 5.14 4.23 - 5.6 M/uL    Hemoglobin 14.9 13.6 - 17.2 g/dL    Hematocrit 43.1 41.1 - 50.3 %    MCV 83.9 82 - 102 FL    MCH 29.0 26.1 - 32.9 PG    MCHC 34.6 31.4 - 35.0 g/dL    RDW 12.1 11.9 - 14.6 %    Platelets 204 150 - 450 K/uL    MPV 11.4 9.4 - 12.3 FL    nRBC 0.00 0.0 - 0.2 K/uL   POCT Glucose    Collection Time: 04/11/24  7:43 AM   Result Value Ref Range    POC Glucose 183 (H) 65 - 100

## 2024-04-12 VITALS
HEART RATE: 72 BPM | WEIGHT: 208.9 LBS | DIASTOLIC BLOOD PRESSURE: 89 MMHG | TEMPERATURE: 97.7 F | HEIGHT: 67 IN | BODY MASS INDEX: 32.79 KG/M2 | OXYGEN SATURATION: 99 % | RESPIRATION RATE: 18 BRPM | SYSTOLIC BLOOD PRESSURE: 137 MMHG

## 2024-04-12 LAB
ANION GAP SERPL CALC-SCNC: 5 MMOL/L (ref 2–11)
BUN SERPL-MCNC: 18 MG/DL (ref 6–23)
CALCIUM SERPL-MCNC: 9 MG/DL (ref 8.3–10.4)
CHLORIDE SERPL-SCNC: 109 MMOL/L (ref 103–113)
CO2 SERPL-SCNC: 23 MMOL/L (ref 21–32)
CREAT SERPL-MCNC: 1.4 MG/DL (ref 0.8–1.5)
ERYTHROCYTE [DISTWIDTH] IN BLOOD BY AUTOMATED COUNT: 12.1 % (ref 11.9–14.6)
GLUCOSE BLD STRIP.AUTO-MCNC: 143 MG/DL (ref 65–100)
GLUCOSE SERPL-MCNC: 148 MG/DL (ref 65–100)
HCT VFR BLD AUTO: 42.4 % (ref 41.1–50.3)
HGB BLD-MCNC: 14.4 G/DL (ref 13.6–17.2)
MCH RBC QN AUTO: 28.9 PG (ref 26.1–32.9)
MCHC RBC AUTO-ENTMCNC: 34 G/DL (ref 31.4–35)
MCV RBC AUTO: 85.1 FL (ref 82–102)
NRBC # BLD: 0 K/UL (ref 0–0.2)
PLATELET # BLD AUTO: 183 K/UL (ref 150–450)
PMV BLD AUTO: 11.6 FL (ref 9.4–12.3)
POTASSIUM SERPL-SCNC: 3.8 MMOL/L (ref 3.5–5.1)
RBC # BLD AUTO: 4.98 M/UL (ref 4.23–5.6)
SERVICE CMNT-IMP: ABNORMAL
SODIUM SERPL-SCNC: 137 MMOL/L (ref 136–146)
WBC # BLD AUTO: 5.5 K/UL (ref 4.3–11.1)

## 2024-04-12 PROCEDURE — 82962 GLUCOSE BLOOD TEST: CPT

## 2024-04-12 PROCEDURE — 6370000000 HC RX 637 (ALT 250 FOR IP): Performed by: HOSPITALIST

## 2024-04-12 PROCEDURE — 36415 COLL VENOUS BLD VENIPUNCTURE: CPT

## 2024-04-12 PROCEDURE — 85027 COMPLETE CBC AUTOMATED: CPT

## 2024-04-12 PROCEDURE — 80048 BASIC METABOLIC PNL TOTAL CA: CPT

## 2024-04-12 RX ORDER — LANCETS 30 GAUGE
1 EACH MISCELLANEOUS DAILY
Qty: 100 EACH | Refills: 5 | Status: SHIPPED | OUTPATIENT
Start: 2024-04-12

## 2024-04-12 RX ORDER — IRBESARTAN 150 MG/1
75 TABLET ORAL DAILY
Qty: 60 TABLET | Refills: 3 | Status: SHIPPED | OUTPATIENT
Start: 2024-04-12

## 2024-04-12 RX ORDER — EZETIMIBE 10 MG/1
10 TABLET ORAL NIGHTLY
Qty: 30 TABLET | Refills: 3 | Status: SHIPPED | OUTPATIENT
Start: 2024-04-12

## 2024-04-12 RX ORDER — ATORVASTATIN CALCIUM 40 MG/1
40 TABLET, FILM COATED ORAL NIGHTLY
Qty: 30 TABLET | Refills: 3 | Status: SHIPPED | OUTPATIENT
Start: 2024-04-12

## 2024-04-12 RX ORDER — CARVEDILOL 25 MG/1
25 TABLET ORAL 2 TIMES DAILY WITH MEALS
Qty: 60 TABLET | Refills: 3 | Status: SHIPPED | OUTPATIENT
Start: 2024-04-12

## 2024-04-12 RX ORDER — LANOLIN ALCOHOL/MO/W.PET/CERES
100 CREAM (GRAM) TOPICAL DAILY
Qty: 30 TABLET | Refills: 3 | Status: SHIPPED | OUTPATIENT
Start: 2024-04-12

## 2024-04-12 RX ORDER — GLUCOSAMINE HCL/CHONDROITIN SU 500-400 MG
CAPSULE ORAL SEE ADMIN INSTRUCTIONS
Qty: 100 STRIP | Refills: 5 | Status: SHIPPED | OUTPATIENT
Start: 2024-04-12 | End: 2024-04-12

## 2024-04-12 RX ORDER — INSULIN LISPRO 100 [IU]/ML
5 INJECTION, SOLUTION INTRAVENOUS; SUBCUTANEOUS
Qty: 5 ADJUSTABLE DOSE PRE-FILLED PEN SYRINGE | Refills: 5 | Status: SHIPPED | OUTPATIENT
Start: 2024-04-12 | End: 2024-05-12

## 2024-04-12 RX ORDER — INSULIN GLARGINE 100 [IU]/ML
28 INJECTION, SOLUTION SUBCUTANEOUS DAILY
Qty: 5 ADJUSTABLE DOSE PRE-FILLED PEN SYRINGE | Refills: 5 | Status: SHIPPED | OUTPATIENT
Start: 2024-04-12 | End: 2024-04-12

## 2024-04-12 RX ORDER — GLUCOSAMINE HCL/CHONDROITIN SU 500-400 MG
CAPSULE ORAL SEE ADMIN INSTRUCTIONS
Qty: 100 STRIP | Refills: 5 | Status: SHIPPED | OUTPATIENT
Start: 2024-04-12

## 2024-04-12 RX ORDER — PANTOPRAZOLE SODIUM 40 MG/1
40 TABLET, DELAYED RELEASE ORAL ONCE
Status: COMPLETED | OUTPATIENT
Start: 2024-04-12 | End: 2024-04-12

## 2024-04-12 RX ORDER — INSULIN GLARGINE 100 [IU]/ML
28 INJECTION, SOLUTION SUBCUTANEOUS DAILY
Qty: 5 ADJUSTABLE DOSE PRE-FILLED PEN SYRINGE | Refills: 5 | Status: SHIPPED | OUTPATIENT
Start: 2024-04-12

## 2024-04-12 RX ORDER — BLOOD-GLUCOSE METER
1 KIT MISCELLANEOUS DAILY PRN
Qty: 1 KIT | Refills: 0 | Status: SHIPPED | OUTPATIENT
Start: 2024-04-12

## 2024-04-12 RX ADMIN — INSULIN GLARGINE 28 UNITS: 100 INJECTION, SOLUTION SUBCUTANEOUS at 08:37

## 2024-04-12 RX ADMIN — ALOGLIPTIN 12.5 MG: 12.5 TABLET, FILM COATED ORAL at 08:39

## 2024-04-12 RX ADMIN — AMLODIPINE BESYLATE 10 MG: 10 TABLET ORAL at 08:40

## 2024-04-12 RX ADMIN — LOSARTAN POTASSIUM 25 MG: 25 TABLET, FILM COATED ORAL at 08:40

## 2024-04-12 RX ADMIN — Medication 100 MG: at 08:39

## 2024-04-12 RX ADMIN — INSULIN LISPRO 10 UNITS: 100 INJECTION, SOLUTION INTRAVENOUS; SUBCUTANEOUS at 08:37

## 2024-04-12 RX ADMIN — PANTOPRAZOLE SODIUM 40 MG: 40 TABLET, DELAYED RELEASE ORAL at 08:45

## 2024-04-12 RX ADMIN — CARVEDILOL 25 MG: 25 TABLET, FILM COATED ORAL at 08:40

## 2024-04-12 NOTE — DIABETES MGMT
Patient admitted with hyperglycemia. Blood glucose ranged 157-211 yesterday with patient receiving Lantus 28 units, Humalog 34 units, and Alogliptin 12.5mg. Blood glucose this morning was 143. Reviewed patient current regimen: Lantus 28 units daily, Humalog 10 units with meals, Humalog correctional insulin, and Alogliptin 12.5mg daily. Noted patient to discharge. Per chart review patient to discharge on Lantus 28 units daily, Humalog 5 units with meals and Metformin 500mg BID. Noted patient did specifiy educator how much alcohol he drinks at home. Provider updated via [employer]. Reviewed discharge regimen with patient. Patient prefers insulin pens. Educated patient regarding increased risk of hypoglycemia when consuming alcohol and taking Metformin and insulin. Patient states he does not plan to drink. Reviewed ADA recommendations regarding alcohol and diabetes and handout was provided. Encouraged compliance with discharge regimen. Encouraged patient to continue to work on lifestyle modifications and to follow up with primary care provider for further titration of regimen. Patient verbalized understanding and voices no further questions regarding diabetes management at this time. Provider at bedside at this time to talk with patient.

## 2024-04-12 NOTE — PLAN OF CARE
Problem: Discharge Planning  Goal: Discharge to home or other facility with appropriate resources  4/12/2024 0932 by Nancy Kumar RN  Outcome: Progressing  4/11/2024 1953 by Flora Strickland RN  Outcome: Progressing  Flowsheets (Taken 4/11/2024 1904)  Discharge to home or other facility with appropriate resources: Identify barriers to discharge with patient and caregiver     Problem: Pain  Goal: Verbalizes/displays adequate comfort level or baseline comfort level  4/12/2024 0932 by Nancy Kumar RN  Outcome: Progressing  4/11/2024 1953 by Flora Strickland RN  Outcome: Progressing  Flowsheets (Taken 4/11/2024 1904)  Verbalizes/displays adequate comfort level or baseline comfort level: Encourage patient to monitor pain and request assistance     Problem: Safety - Adult  Goal: Free from fall injury  4/12/2024 0932 by Nancy Kumar RN  Outcome: Progressing  4/11/2024 1953 by Flora Strickland RN  Outcome: Progressing  Flowsheets (Taken 4/11/2024 1904)  Free From Fall Injury: Instruct family/caregiver on patient safety     Problem: Skin/Tissue Integrity  Goal: Absence of new skin breakdown  Description: 1.  Monitor for areas of redness and/or skin breakdown  2.  Assess vascular access sites hourly  3.  Every 4-6 hours minimum:  Change oxygen saturation probe site  4.  Every 4-6 hours:  If on nasal continuous positive airway pressure, respiratory therapy assess nares and determine need for appliance change or resting period.  4/12/2024 0932 by Nancy Kumar RN  Outcome: Progressing  4/11/2024 1953 by Flora Strickland RN  Outcome: Progressing     Problem: Metabolic/Fluid and Electrolytes - Adult  Goal: Electrolytes maintained within normal limits  4/11/2024 1953 by Flora Strickland RN  Outcome: Progressing  Flowsheets (Taken 4/11/2024 1904)  Electrolytes maintained within normal limits: Monitor labs and assess patient for signs and symptoms of electrolyte imbalances  Goal: Hemodynamic

## 2024-04-12 NOTE — DISCHARGE SUMMARY
Hospitalist Discharge Summary   Admit Date:  2024  8:26 PM   DC Note date: 2024  Name:  Aleena Baumann   Age:  48 y.o.  Sex:  male  :  1975   MRN:  [id number]   Room:  [facility]  PCP:  Kenna Barraza MD    Presenting Complaint: Hyperglycemia     Initial Admission Diagnosis: Stage 3a chronic kidney disease (HCC) [N18.31]  Hyperglycemia due to diabetes mellitus (HCC) [E11.65]  Hyperosmolar hyperglycemic state (HHS) (HCC) [E11.00]     Problem List for this Hospitalization (present on admission):    Principal Problem:    Hyperglycemia due to diabetes mellitus (HCC)  Active Problems:    Chronic kidney disease (CKD) stage G3b/A1, moderately decreased glomerular filtration rate (GFR) between 30-44 mL/min/1.73 square meter and albuminuria creatinine ratio less than 30 mg/g (HCC)    Dyslipidemia    Hypertension    Obesity (BMI 30-39.9)    GERD without esophagitis    GERTRUDE (acute kidney injury) ([facility])  Resolved Problems:    * No resolved hospital problems. *      Hospital Course:  Patient is a 48-year-old male with history of prediabetes, hypertension, CKD stage III, dyslipidemia, obesity with BMI 32.7, admitted on 2024 with DKA.  Patient presented with blurry vision, polydipsia and excessive urination.  In the ED and blood glucose was 939.  Patient was admitted to the ICU for hyperglycemia and new onset diabetes.  He was started on insulin infusion and improved.  He has been downgraded to medical floors.       Hyperglycemia due to diabetes mellitus (HCC)  Newly diagnosed diabetes   -  DKA has resolved  A1c: 10.7  Patient will be discharged on Lantus 28 units subcu daily along with Humalog 5 units 3 times daily AC.  Will be discharged on metformin 500 mg p.o. twice daily as well.  Patient provided prescription for glucometer, glucose test strips, pen needles, lancets.  Diabetes educator has met with the patient and counseled about lifestyle modification and dietary changes.     GERTRUDE on

## 2024-04-12 NOTE — CARE COORDINATION
Pt to d/c home today.  Family will provide transportation.  There were no PT/OT consults ordered during this hospitalization.  Pt is not homebound - no HH SN ordered.  Diabetic education provided during IP stay.  No supportive care needs identified.  Pt agrees with d/c plan.  Milestones met.  LOS = 4 days       04/09/24 1045   Service Assessment   Patient Orientation Alert and Oriented;Person;Place;Self   Cognition Alert   History Provided By Patient;Medical Record   Primary Caregiver Self   Accompanied By/Relationship N/A   Support Systems Spouse/Significant Other;Children;Family Members   Patient's Healthcare Decision Maker is: Legal Next of Kin   PCP Verified by CM Yes  (Kenna Fitzgerald MD)   Last Visit to PCP Within last 3 months   Prior Functional Level Independent in ADLs/IADLs   Current Functional Level Independent in ADLs/IADLs   Can patient return to prior living arrangement Yes   Ability to make needs known: Good   Family able to assist with home care needs: Yes   Would you like for me to discuss the discharge plan with any other family members/significant others, and if so, who? No   Financial Resources Other (Comment)  (BCBS)   Community Resources None   CM/SW Referral Other (see comment)  (N/A)   Social/Functional History   Lives With Spouse;Daughter   Type of Home Condo   Home Layout Two level   Bathroom Toilet Standard   Bathroom Equipment Commode   Bathroom Accessibility Accessible   Home Equipment None   Receives Help From Family   ADL Assistance Independent   Homemaking Assistance Independent   Homemaking Responsibilities Yes   Ambulation Assistance Independent   Transfer Assistance Independent   Active  Yes   Mode of Transportation Car   Occupation Full time employment   Type of Occupation [employer]   Discharge Planning   Type of Residence House   Living Arrangements Spouse/Significant Other   Current Services Prior To Admission None   Potential Assistance Needed N/A   DME Ordered?

## 2024-04-17 ENCOUNTER — PATIENT MESSAGE (OUTPATIENT)
Dept: INTERNAL MEDICINE CLINIC | Facility: CLINIC | Age: 49
End: 2024-04-17

## 2024-04-17 NOTE — TELEPHONE ENCOUNTER
From: Aleena Baumann  To: Dr. Kenna Fitzgerald  Sent: 4/17/2024 10:15 AM EDT  Subject: Understanding of Type 2 diabetes meds    Good morning   Jie recently found out I am type 2 diabetic and not sure when to take meds.. I’ve been keeping a chart monitor my sugar however not sure I should take the dosage prescribed when it’s (my sugar reads normal).. for example this morning my sugar was 89 so I’m not sure if I should take insulin?.. can you schedule a visit ? Or send me to diabetes doctor?.. again I have a chart and I would like to see you to discuss.    Thanks

## 2024-04-19 ENCOUNTER — OFFICE VISIT (OUTPATIENT)
Dept: INTERNAL MEDICINE CLINIC | Facility: CLINIC | Age: 49
End: 2024-04-19
Payer: COMMERCIAL

## 2024-04-19 VITALS
BODY MASS INDEX: 33.42 KG/M2 | OXYGEN SATURATION: 97 % | HEART RATE: 78 BPM | SYSTOLIC BLOOD PRESSURE: 112 MMHG | DIASTOLIC BLOOD PRESSURE: 72 MMHG | WEIGHT: 212.9 LBS | HEIGHT: 67 IN

## 2024-04-19 DIAGNOSIS — Z79.4 TYPE 2 DIABETES MELLITUS WITH HYPERGLYCEMIA, WITH LONG-TERM CURRENT USE OF INSULIN (HCC): ICD-10-CM

## 2024-04-19 DIAGNOSIS — N17.9 AKI (ACUTE KIDNEY INJURY) (HCC): ICD-10-CM

## 2024-04-19 DIAGNOSIS — N18.32 CHRONIC KIDNEY DISEASE (CKD) STAGE G3B/A1, MODERATELY DECREASED GLOMERULAR FILTRATION RATE (GFR) BETWEEN 30-44 ML/MIN/1.73 SQUARE METER AND ALBUMINURIA CREATININE RATIO LESS THAN 30 MG/G (HCC): ICD-10-CM

## 2024-04-19 DIAGNOSIS — E11.65 TYPE 2 DIABETES MELLITUS WITH HYPERGLYCEMIA, WITH LONG-TERM CURRENT USE OF INSULIN (HCC): ICD-10-CM

## 2024-04-19 DIAGNOSIS — I10 PRIMARY HYPERTENSION: Primary | ICD-10-CM

## 2024-04-19 PROBLEM — R73.03 PREDIABETES: Status: RESOLVED | Noted: 2023-01-04 | Resolved: 2024-04-19

## 2024-04-19 PROCEDURE — 3046F HEMOGLOBIN A1C LEVEL >9.0%: CPT | Performed by: INTERNAL MEDICINE

## 2024-04-19 PROCEDURE — 99214 OFFICE O/P EST MOD 30 MIN: CPT | Performed by: INTERNAL MEDICINE

## 2024-04-19 PROCEDURE — 3078F DIAST BP <80 MM HG: CPT | Performed by: INTERNAL MEDICINE

## 2024-04-19 PROCEDURE — 3074F SYST BP LT 130 MM HG: CPT | Performed by: INTERNAL MEDICINE

## 2024-04-19 RX ORDER — ACYCLOVIR 400 MG/1
TABLET ORAL
Qty: 12 EACH | Refills: 1 | Status: SHIPPED | OUTPATIENT
Start: 2024-04-19

## 2024-04-19 RX ORDER — AMLODIPINE BESYLATE 10 MG/1
10 TABLET ORAL DAILY
Qty: 90 TABLET | Refills: 1 | Status: SHIPPED | OUTPATIENT
Start: 2024-04-19

## 2024-04-19 ASSESSMENT — ENCOUNTER SYMPTOMS
ABDOMINAL DISTENTION: 0
ABDOMINAL PAIN: 0

## 2024-04-19 NOTE — PROGRESS NOTES
Chief Complaint   Patient presents with    Blood Pressure Check     Discuss diabetes diagnosis         Aleena Baumann is a 48 y.o. male who presents today for Blood Pressure Check (Discuss diabetes diagnosis )     Patient was recently admitted for elevated blood sugars in the 900s, she was admitted for 5 days, stabilized, and released with medications like metformin and insulin Lantus, NovoLog  Currently using Lantus 20 units  5 units of NovoLog with meals  Has been using his meter Freestyle  Blood sugars has been well-controlled, he is concerned about hypoglycemia as his blood sugars has been in the 80s especially in the morning, he has been trying to make changes in diet, eating less carbs,  He is currently taking his blood pressure medications, reports more compliant since the recent admission  Renal failure has resolved,  At baseline with chronic kidney disease  Denies hypoglycemia symptoms      Wt Readings from Last 3 Encounters:   04/19/24 96.6 kg (212 lb 14.4 oz)   04/10/24 94.8 kg (208 lb 14.4 oz)   04/08/24 93.3 kg (205 lb 9.6 oz)     Vitals:    04/19/24 0956   BP: 112/72   Site: Left Upper Arm   Position: Sitting   Pulse: 78   SpO2: 97%   Weight: 96.6 kg (212 lb 14.4 oz)   Height: 1.702 m (5' 7\")        Assessment and plan:  1. Primary hypertension  -     amLODIPine (NORVASC) 10 MG tablet; Take 1 tablet by mouth daily, Disp-90 tablet, R-1Normal  2. GERTRUDE (acute kidney injury) (HCC)  3. Type 2 diabetes mellitus with hyperglycemia, with long-term current use of insulin (HCC)  -     Continuous Blood Gluc Sensor (DEXCOM G7 SENSOR) MISC; Use as directed, Disp-12 each, R-1Normal  -     Hemoglobin A1C; Future  -     Comprehensive Metabolic Panel; Future  -     [facility]  4. Chronic kidney disease (CKD) stage G3b/A1, moderately decreased glomerular filtration rate (GFR) between 30-44 mL/min/1.73 square meter and albuminuria creatinine ratio less than 30 mg/g  Epidermal Closure Graft Donor Site (Optional): simple interrupted

## 2024-05-21 ENCOUNTER — HOSPITAL ENCOUNTER (OUTPATIENT)
Dept: NUTRITION | Age: 49
Setting detail: RECURRING SERIES
Discharge: HOME | End: 2024-05-24

## 2024-05-21 PROCEDURE — 97802 MEDICAL NUTRITION INDIV IN: CPT

## 2024-05-21 NOTE — PROGRESS NOTES
Lary Hart, MS RD LD, [facility]  Outpatient Registered Dietitian  [facility]  Phone: [phone] Fax: [phone]    ASSESSMENT  Pt is a 48 y.o. male referred with the following diagnosis (es): Type 2 diabetes mellitus with hyperglycemia [E11.65]  Long term (current) use of insulin [Z79.4]     Duration/onset of DM:  Newly dx after hospitalization for DKA, A1C on 4/8/2024= 10.6  Required hospitalization for glycemic control?  Yes 4/2024 , admission glucose >900.   Historical A1c levels:  A1C 6.1 1/4/22,   Prior formal DM education program?  None  Microvascular diabetic complications: renal   Other pertinent PMH:  HLD, CKD, HTN, GERD  DM medications:   Basal insulin Lantus 28 units which he has stopped taking, 5 units prandial insulin which he takes before or after meals.   BG Monitoring:   FSBG- checks fasting and before meals 4x daily, 1 week recorded BG 20% showed hypoglycemic range 60-70. Before meals. Explains he had some lows in the morning but has not experienced this since d/c the lantus.  This pt will benefit from a CGM   Hypoglycemia:    yes  Exercise:   yes, exercises daily, cardio, weights.     Diet: Diet recall indicates standard Western diet. Did not bring in tracked meals, skips lunch  B: bran flakes with milk, whole banana,  L: skips, sometime snacks  D: did not discuss, ate out yesterday to celebrate his daughter's graduation, post meal blood sugar 180 consume 4 sweet rolls.  Snacks: chips  Meal timing: discussed the importance.     Barriers to improved glycemic control identified today:   nutrition knowledge, medication knowledge, and adherence.       Lifestyle/Family Influence/Support: works full time, often travels for work  Stage of Change: Action.    Anthropometrics:   Estimated body mass index is 33.34 kg/m² as calculated from the following:    Height as of 4/19/24: 1.702 m (5' 7\").    Weight as of 4/19/24: 96.6 kg (212 lb 14.4 oz).     Estimated Nutrition

## 2024-05-30 ENCOUNTER — OFFICE VISIT (OUTPATIENT)
Dept: INTERNAL MEDICINE CLINIC | Facility: CLINIC | Age: 49
End: 2024-05-30
Payer: COMMERCIAL

## 2024-05-30 ENCOUNTER — PATIENT MESSAGE (OUTPATIENT)
Dept: INTERNAL MEDICINE CLINIC | Facility: CLINIC | Age: 49
End: 2024-05-30

## 2024-05-30 VITALS
SYSTOLIC BLOOD PRESSURE: 140 MMHG | HEART RATE: 66 BPM | HEIGHT: 67 IN | DIASTOLIC BLOOD PRESSURE: 90 MMHG | BODY MASS INDEX: 33.34 KG/M2 | OXYGEN SATURATION: 99 % | WEIGHT: 212.4 LBS

## 2024-05-30 DIAGNOSIS — N18.32 CHRONIC KIDNEY DISEASE (CKD) STAGE G3B/A1, MODERATELY DECREASED GLOMERULAR FILTRATION RATE (GFR) BETWEEN 30-44 ML/MIN/1.73 SQUARE METER AND ALBUMINURIA CREATININE RATIO LESS THAN 30 MG/G (HCC): ICD-10-CM

## 2024-05-30 DIAGNOSIS — I10 PRIMARY HYPERTENSION: Primary | ICD-10-CM

## 2024-05-30 DIAGNOSIS — E11.65 HYPERGLYCEMIA DUE TO DIABETES MELLITUS (HCC): ICD-10-CM

## 2024-05-30 PROCEDURE — 3080F DIAST BP >= 90 MM HG: CPT | Performed by: INTERNAL MEDICINE

## 2024-05-30 PROCEDURE — 99214 OFFICE O/P EST MOD 30 MIN: CPT | Performed by: INTERNAL MEDICINE

## 2024-05-30 PROCEDURE — 3077F SYST BP >= 140 MM HG: CPT | Performed by: INTERNAL MEDICINE

## 2024-05-30 PROCEDURE — 3046F HEMOGLOBIN A1C LEVEL >9.0%: CPT | Performed by: INTERNAL MEDICINE

## 2024-05-30 ASSESSMENT — ENCOUNTER SYMPTOMS
SHORTNESS OF BREATH: 0
COUGH: 0
ABDOMINAL DISTENTION: 0

## 2024-05-30 NOTE — TELEPHONE ENCOUNTER
Pt was seen in office today. After visit pt was requesting insulin/medications clarifications.   Pt informed via [employer].

## 2024-06-07 ENCOUNTER — PATIENT MESSAGE (OUTPATIENT)
Dept: INTERNAL MEDICINE CLINIC | Facility: CLINIC | Age: 49
End: 2024-06-07

## 2024-06-07 NOTE — TELEPHONE ENCOUNTER
Pt had question in regards to sensor reading, seemed to have a low reading of below 70 for blood sugar. Pt then checked blood sugar with glucose monitor, resulted in normal reading.   Pt instructed to check blood sugars with glucose monitor when false reading occur.      . Pt informed via [employer].

## 2024-06-07 NOTE — TELEPHONE ENCOUNTER
From: Aleena Baumann  To: Dr. Kenna Fitzgerald  Sent: 6/7/2024 7:20 AM EDT  Subject: Renuka 3 functions     Good morning! Hopefully all is well .  I’ve been out of town due to my job and my monitor has been out of wack .. had several readings below 70 and twice I’ve checked my sugar manually and monitor for ex reads 67 however actual sugar test was 88.. is it normal for readings to be off?.. or am I’m doing something wrong and possibly caused this reading to be in error ?.    Thanks

## 2024-07-05 DIAGNOSIS — E11.65 TYPE 2 DIABETES MELLITUS WITH HYPERGLYCEMIA, WITH LONG-TERM CURRENT USE OF INSULIN (HCC): ICD-10-CM

## 2024-07-05 DIAGNOSIS — Z79.4 TYPE 2 DIABETES MELLITUS WITH HYPERGLYCEMIA, WITH LONG-TERM CURRENT USE OF INSULIN (HCC): ICD-10-CM

## 2024-07-05 LAB
ALBUMIN SERPL-MCNC: 3.8 G/DL (ref 3.5–5)
ALBUMIN/GLOB SERPL: 1.1 (ref 1–1.9)
ALP SERPL-CCNC: 80 U/L (ref 40–129)
ALT SERPL-CCNC: 31 U/L (ref 12–65)
ANION GAP SERPL CALC-SCNC: 10 MMOL/L (ref 9–18)
AST SERPL-CCNC: 34 U/L (ref 15–37)
BILIRUB SERPL-MCNC: 0.5 MG/DL (ref 0–1.2)
BUN SERPL-MCNC: 19 MG/DL (ref 6–23)
CALCIUM SERPL-MCNC: 9.6 MG/DL (ref 8.8–10.2)
CHLORIDE SERPL-SCNC: 105 MMOL/L (ref 98–107)
CO2 SERPL-SCNC: 24 MMOL/L (ref 20–28)
CREAT SERPL-MCNC: 1.67 MG/DL (ref 0.8–1.3)
EST. AVERAGE GLUCOSE BLD GHB EST-MCNC: 150 MG/DL
GLOBULIN SER CALC-MCNC: 3.4 G/DL (ref 2.3–3.5)
GLUCOSE SERPL-MCNC: 90 MG/DL (ref 70–99)
HBA1C MFR BLD: 6.8 % (ref 0–5.6)
POTASSIUM SERPL-SCNC: 4.1 MMOL/L (ref 3.5–5.1)
PROT SERPL-MCNC: 7.2 G/DL (ref 6.3–8.2)
SODIUM SERPL-SCNC: 140 MMOL/L (ref 136–145)

## 2024-07-11 ENCOUNTER — OFFICE VISIT (OUTPATIENT)
Dept: INTERNAL MEDICINE CLINIC | Facility: CLINIC | Age: 49
End: 2024-07-11
Payer: COMMERCIAL

## 2024-07-11 VITALS
HEART RATE: 86 BPM | DIASTOLIC BLOOD PRESSURE: 84 MMHG | WEIGHT: 199.4 LBS | OXYGEN SATURATION: 96 % | BODY MASS INDEX: 31.3 KG/M2 | HEIGHT: 67 IN | SYSTOLIC BLOOD PRESSURE: 126 MMHG

## 2024-07-11 DIAGNOSIS — N18.32 CHRONIC KIDNEY DISEASE (CKD) STAGE G3B/A1, MODERATELY DECREASED GLOMERULAR FILTRATION RATE (GFR) BETWEEN 30-44 ML/MIN/1.73 SQUARE METER AND ALBUMINURIA CREATININE RATIO LESS THAN 30 MG/G (HCC): ICD-10-CM

## 2024-07-11 DIAGNOSIS — E78.5 DYSLIPIDEMIA: Primary | ICD-10-CM

## 2024-07-11 DIAGNOSIS — I10 PRIMARY HYPERTENSION: ICD-10-CM

## 2024-07-11 DIAGNOSIS — E11.65 HYPERGLYCEMIA DUE TO DIABETES MELLITUS (HCC): ICD-10-CM

## 2024-07-11 PROCEDURE — 3079F DIAST BP 80-89 MM HG: CPT | Performed by: INTERNAL MEDICINE

## 2024-07-11 PROCEDURE — 3044F HG A1C LEVEL LT 7.0%: CPT | Performed by: INTERNAL MEDICINE

## 2024-07-11 PROCEDURE — 3074F SYST BP LT 130 MM HG: CPT | Performed by: INTERNAL MEDICINE

## 2024-07-11 PROCEDURE — 99214 OFFICE O/P EST MOD 30 MIN: CPT | Performed by: INTERNAL MEDICINE

## 2024-07-11 RX ORDER — EZETIMIBE 10 MG/1
10 TABLET ORAL NIGHTLY
Qty: 90 TABLET | Refills: 1 | Status: SHIPPED | OUTPATIENT
Start: 2024-07-11

## 2024-07-11 RX ORDER — ESOMEPRAZOLE MAGNESIUM 40 MG/1
40 CAPSULE, DELAYED RELEASE ORAL
COMMUNITY
Start: 2024-06-16 | End: 2024-07-11 | Stop reason: ALTCHOICE

## 2024-07-11 RX ORDER — ATORVASTATIN CALCIUM 40 MG/1
40 TABLET, FILM COATED ORAL NIGHTLY
Qty: 90 TABLET | Refills: 1 | Status: SHIPPED | OUTPATIENT
Start: 2024-07-11

## 2024-07-11 RX ORDER — IRBESARTAN 150 MG/1
75 TABLET ORAL DAILY
Qty: 45 TABLET | Refills: 1 | OUTPATIENT
Start: 2024-07-11

## 2024-07-11 RX ORDER — AMLODIPINE BESYLATE 10 MG/1
10 TABLET ORAL DAILY
Qty: 90 TABLET | Refills: 1 | Status: SHIPPED | OUTPATIENT
Start: 2024-07-11

## 2024-07-11 RX ORDER — CARVEDILOL 12.5 MG/1
12.5 TABLET ORAL 2 TIMES DAILY
Qty: 180 TABLET | Refills: 1 | Status: SHIPPED | OUTPATIENT
Start: 2024-07-11

## 2024-07-11 RX ORDER — LANOLIN ALCOHOL/MO/W.PET/CERES
100 CREAM (GRAM) TOPICAL DAILY
Qty: 90 TABLET | Refills: 1 | Status: CANCELLED | OUTPATIENT
Start: 2024-07-11

## 2024-07-11 ASSESSMENT — ENCOUNTER SYMPTOMS
SHORTNESS OF BREATH: 0
COUGH: 0
ABDOMINAL DISTENTION: 0

## 2024-07-11 NOTE — PROGRESS NOTES
Chief Complaint   Patient presents with    Follow-up     HTN, diabetes f/u        Aleena Baumann is a 48 y.o. male who presents today for Follow-up (HTN, diabetes f/u)     He is here for follow-up in chronic conditions including hypertension, hyperlipidemia, diabetes,  Since last visit, he has been able to lose more than 12 pounds, he continues to be mindful in regards meals, has been trying to apply it everything that he has learned in regards to nutrition, he is no longer taking insulin, he reports compliance with Jardiance and metformin, has been checking his blood sugars through continue monitoring device, in range 99% to 100% of the time,  Reports different challenges especially traveling for work, and eating out,  Reports compliance with the medications for hypertension,  Doses were reviewed today,  He also has been decreasing alcohol intake significantly,      Wt Readings from Last 3 Encounters:   07/11/24 90.4 kg (199 lb 6.4 oz)   05/30/24 96.3 kg (212 lb 6.4 oz)   04/19/24 96.6 kg (212 lb 14.4 oz)     Vitals:    07/11/24 1134   BP: 126/84   Site: Right Upper Arm   Position: Sitting   Pulse: 86   SpO2: 96%   Weight: 90.4 kg (199 lb 6.4 oz)   Height: 1.702 m (5' 7\")        Assessment and plan:  1. Dyslipidemia  -     atorvastatin (LIPITOR) 40 MG tablet; Take 1 tablet by mouth nightly, Disp-90 tablet, R-1Normal  -     ezetimibe (ZETIA) 10 MG tablet; Take 1 tablet by mouth nightly, Disp-90 tablet, R-1Normal  -     CBC with Auto Differential; Future  -     Lipid Panel; Future  -     Hemoglobin A1C; Future  -     Microalbumin / Creatinine Urine Ratio; Future  -     Comprehensive Metabolic Panel; Future  -     TSH with Reflex; Future  2. Primary hypertension  -     amLODIPine (NORVASC) 10 MG tablet; Take 1 tablet by mouth daily, Disp-90 tablet, R-1Normal  -     carvedilol (COREG) 12.5 MG tablet; Take 1 tablet by mouth 2 times daily, Disp-180 tablet, R-1Normal  -     CBC with Auto Differential; Future  -

## 2024-07-22 DIAGNOSIS — Z79.4 TYPE 2 DIABETES MELLITUS WITH HYPERGLYCEMIA, WITH LONG-TERM CURRENT USE OF INSULIN (HCC): ICD-10-CM

## 2024-07-22 DIAGNOSIS — N18.32 CHRONIC KIDNEY DISEASE (CKD) STAGE G3B/A1, MODERATELY DECREASED GLOMERULAR FILTRATION RATE (GFR) BETWEEN 30-44 ML/MIN/1.73 SQUARE METER AND ALBUMINURIA CREATININE RATIO LESS THAN 30 MG/G (HCC): ICD-10-CM

## 2024-07-22 DIAGNOSIS — E11.65 TYPE 2 DIABETES MELLITUS WITH HYPERGLYCEMIA, WITH LONG-TERM CURRENT USE OF INSULIN (HCC): ICD-10-CM

## 2024-07-22 RX ORDER — BLOOD-GLUCOSE SENSOR
EACH MISCELLANEOUS
Qty: 6 EACH | Refills: 3 | Status: CANCELLED | OUTPATIENT
Start: 2024-07-22

## 2024-07-22 NOTE — TELEPHONE ENCOUNTER
[employer] message sent to patient.   According to our records, he should have refills left at the pharmacy.

## 2024-10-21 ENCOUNTER — OFFICE VISIT (OUTPATIENT)
Dept: INTERNAL MEDICINE CLINIC | Facility: CLINIC | Age: 49
End: 2024-10-21
Payer: COMMERCIAL

## 2024-10-21 VITALS
OXYGEN SATURATION: 99 % | WEIGHT: 197.6 LBS | BODY MASS INDEX: 31.01 KG/M2 | HEIGHT: 67 IN | HEART RATE: 67 BPM | SYSTOLIC BLOOD PRESSURE: 130 MMHG | DIASTOLIC BLOOD PRESSURE: 88 MMHG

## 2024-10-21 DIAGNOSIS — I10 PRIMARY HYPERTENSION: ICD-10-CM

## 2024-10-21 DIAGNOSIS — N18.32 CHRONIC KIDNEY DISEASE (CKD) STAGE G3B/A1, MODERATELY DECREASED GLOMERULAR FILTRATION RATE (GFR) BETWEEN 30-44 ML/MIN/1.73 SQUARE METER AND ALBUMINURIA CREATININE RATIO LESS THAN 30 MG/G (HCC): ICD-10-CM

## 2024-10-21 DIAGNOSIS — E11.65 TYPE 2 DIABETES MELLITUS WITH HYPERGLYCEMIA, WITH LONG-TERM CURRENT USE OF INSULIN (HCC): Primary | ICD-10-CM

## 2024-10-21 DIAGNOSIS — Z79.4 TYPE 2 DIABETES MELLITUS WITH HYPERGLYCEMIA, WITH LONG-TERM CURRENT USE OF INSULIN (HCC): Primary | ICD-10-CM

## 2024-10-21 DIAGNOSIS — E78.5 DYSLIPIDEMIA: ICD-10-CM

## 2024-10-21 PROBLEM — Z86.0100 HISTORY OF COLONIC POLYPS: Status: ACTIVE | Noted: 2024-10-21

## 2024-10-21 PROCEDURE — 99214 OFFICE O/P EST MOD 30 MIN: CPT | Performed by: INTERNAL MEDICINE

## 2024-10-21 PROCEDURE — 3075F SYST BP GE 130 - 139MM HG: CPT | Performed by: INTERNAL MEDICINE

## 2024-10-21 PROCEDURE — 3044F HG A1C LEVEL LT 7.0%: CPT | Performed by: INTERNAL MEDICINE

## 2024-10-21 PROCEDURE — 3079F DIAST BP 80-89 MM HG: CPT | Performed by: INTERNAL MEDICINE

## 2024-10-21 RX ORDER — IRBESARTAN 150 MG/1
75 TABLET ORAL DAILY
Qty: 45 TABLET | Refills: 1 | Status: SHIPPED | OUTPATIENT
Start: 2024-10-21

## 2024-10-21 ASSESSMENT — ENCOUNTER SYMPTOMS
ABDOMINAL DISTENTION: 0
WHEEZING: 0
PHOTOPHOBIA: 0
CHEST TIGHTNESS: 0
SHORTNESS OF BREATH: 0

## 2024-10-21 NOTE — PROGRESS NOTES
Take 1 tablet by mouth nightly, Disp: 90 tablet, Rfl: 1    ezetimibe (ZETIA) 10 MG tablet, Take 1 tablet by mouth nightly, Disp: 90 tablet, Rfl: 1    amLODIPine (NORVASC) 10 MG tablet, Take 1 tablet by mouth daily, Disp: 90 tablet, Rfl: 1    empagliflozin (JARDIANCE) 10 MG tablet, Take 1 tablet by mouth daily, Disp: 90 tablet, Rfl: 1    metFORMIN (GLUCOPHAGE) 500 MG tablet, Take 1 tablet by mouth daily (with breakfast), Disp: 90 tablet, Rfl: 1    carvedilol (COREG) 12.5 MG tablet, Take 1 tablet by mouth 2 times daily, Disp: 180 tablet, Rfl: 1    Continuous Glucose Sensor (FREESTYLE PER 3 SENSOR) MISC, Use as prescribed ,  change every 14 days, Disp: 6 each, Rfl: 3    Continuous Glucose  (FREESTYLE PER 3 READER) [place]O, Use as instructed, Disp: 1 each, Rfl: 0    glucose monitoring kit, 1 kit by Does not apply route daily as needed (check blood glucose bid and as needed.), Disp: 1 kit, Rfl: 0    Lancets MISC, 1 each by Does not apply route daily, Disp: 100 each, Rfl: 5    blood glucose monitor strips, by Other route See Admin Instructions Test before meals and bedtime & as needed for symptoms of irregular blood glucose. Dispense sufficient amount for indicated testing frequency plus additional to accommodate PRN testing needs., Disp: 100 strip, Rfl: 5      Family history:    No family history on file.     Past medical history:    Past Medical History:   Diagnosis Date    Hypertension         No past surgical history on file.     Physical exam:    /88   Pulse 67   Ht 1.702 m (5' 7\")   Wt 89.6 kg (197 lb 9.6 oz)   SpO2 99%   BMI 30.95 kg/m²     Physical Exam  Vitals reviewed.   Constitutional:       Appearance: Normal appearance.   HENT:      Head: Normocephalic and atraumatic.   Cardiovascular:      Rate and Rhythm: Normal rate and regular rhythm.   Pulmonary:      Effort: Pulmonary effort is normal.      Breath sounds: Normal breath sounds.   Neurological:      General: No focal deficit present.

## 2024-10-25 DIAGNOSIS — E78.5 DYSLIPIDEMIA: ICD-10-CM

## 2024-10-25 DIAGNOSIS — E11.65 HYPERGLYCEMIA DUE TO DIABETES MELLITUS (HCC): ICD-10-CM

## 2024-10-25 DIAGNOSIS — I10 PRIMARY HYPERTENSION: ICD-10-CM

## 2024-10-25 DIAGNOSIS — N18.32 CHRONIC KIDNEY DISEASE (CKD) STAGE G3B/A1, MODERATELY DECREASED GLOMERULAR FILTRATION RATE (GFR) BETWEEN 30-44 ML/MIN/1.73 SQUARE METER AND ALBUMINURIA CREATININE RATIO LESS THAN 30 MG/G (HCC): ICD-10-CM

## 2024-10-25 LAB
ALBUMIN SERPL-MCNC: 4 G/DL (ref 3.5–5)
ALBUMIN/GLOB SERPL: 1.2 (ref 1–1.9)
ALP SERPL-CCNC: 77 U/L (ref 40–129)
ALT SERPL-CCNC: 34 U/L (ref 8–55)
ANION GAP SERPL CALC-SCNC: 9 MMOL/L (ref 9–18)
AST SERPL-CCNC: 26 U/L (ref 15–37)
BASOPHILS # BLD: 0.1 K/UL (ref 0–0.2)
BASOPHILS NFR BLD: 1 % (ref 0–2)
BILIRUB SERPL-MCNC: 0.4 MG/DL (ref 0–1.2)
BUN SERPL-MCNC: 20 MG/DL (ref 6–23)
CALCIUM SERPL-MCNC: 9.6 MG/DL (ref 8.8–10.2)
CHLORIDE SERPL-SCNC: 103 MMOL/L (ref 98–107)
CHOLEST SERPL-MCNC: 114 MG/DL (ref 0–200)
CO2 SERPL-SCNC: 27 MMOL/L (ref 20–28)
CREAT SERPL-MCNC: 1.63 MG/DL (ref 0.8–1.3)
DIFFERENTIAL METHOD BLD: NORMAL
EOSINOPHIL # BLD: 0.1 K/UL (ref 0–0.8)
EOSINOPHIL NFR BLD: 2 % (ref 0.5–7.8)
ERYTHROCYTE [DISTWIDTH] IN BLOOD BY AUTOMATED COUNT: 13.9 % (ref 11.9–14.6)
EST. AVERAGE GLUCOSE BLD GHB EST-MCNC: 140 MG/DL
GLOBULIN SER CALC-MCNC: 3.2 G/DL (ref 2.3–3.5)
GLUCOSE SERPL-MCNC: 96 MG/DL (ref 70–99)
HBA1C MFR BLD: 6.5 % (ref 0–5.6)
HCT VFR BLD AUTO: 48 % (ref 41.1–50.3)
HDLC SERPL-MCNC: 49 MG/DL (ref 40–60)
HDLC SERPL: 2.4 (ref 0–5)
HGB BLD-MCNC: 15.4 G/DL (ref 13.6–17.2)
IMM GRANULOCYTES # BLD AUTO: 0 K/UL (ref 0–0.5)
IMM GRANULOCYTES NFR BLD AUTO: 0 % (ref 0–5)
LDLC SERPL CALC-MCNC: 53 MG/DL (ref 0–100)
LYMPHOCYTES # BLD: 2.3 K/UL (ref 0.5–4.6)
LYMPHOCYTES NFR BLD: 40 % (ref 13–44)
MCH RBC QN AUTO: 28.4 PG (ref 26.1–32.9)
MCHC RBC AUTO-ENTMCNC: 32.1 G/DL (ref 31.4–35)
MCV RBC AUTO: 88.6 FL (ref 82–102)
MONOCYTES # BLD: 0.6 K/UL (ref 0.1–1.3)
MONOCYTES NFR BLD: 11 % (ref 4–12)
NEUTS SEG # BLD: 2.6 K/UL (ref 1.7–8.2)
NEUTS SEG NFR BLD: 46 % (ref 43–78)
NRBC # BLD: 0 K/UL (ref 0–0.2)
PLATELET # BLD AUTO: 226 K/UL (ref 150–450)
PMV BLD AUTO: 10.6 FL (ref 9.4–12.3)
POTASSIUM SERPL-SCNC: 4.4 MMOL/L (ref 3.5–5.1)
PROT SERPL-MCNC: 7.2 G/DL (ref 6.3–8.2)
RBC # BLD AUTO: 5.42 M/UL (ref 4.23–5.6)
SODIUM SERPL-SCNC: 139 MMOL/L (ref 136–145)
TRIGL SERPL-MCNC: 65 MG/DL (ref 0–150)
TSH W FREE THYROID IF ABNORMAL: 1.85 UIU/ML (ref 0.27–4.2)
VLDLC SERPL CALC-MCNC: 13 MG/DL (ref 6–23)
WBC # BLD AUTO: 5.6 K/UL (ref 4.3–11.1)

## 2024-10-28 ENCOUNTER — PATIENT MESSAGE (OUTPATIENT)
Dept: INTERNAL MEDICINE CLINIC | Facility: CLINIC | Age: 49
End: 2024-10-28

## 2024-10-28 DIAGNOSIS — N18.32 CHRONIC KIDNEY DISEASE (CKD) STAGE G3B/A1, MODERATELY DECREASED GLOMERULAR FILTRATION RATE (GFR) BETWEEN 30-44 ML/MIN/1.73 SQUARE METER AND ALBUMINURIA CREATININE RATIO LESS THAN 30 MG/G (HCC): ICD-10-CM

## 2024-10-28 DIAGNOSIS — Z79.4 TYPE 2 DIABETES MELLITUS WITH HYPERGLYCEMIA, WITH LONG-TERM CURRENT USE OF INSULIN (HCC): ICD-10-CM

## 2024-10-28 DIAGNOSIS — E78.5 DYSLIPIDEMIA: ICD-10-CM

## 2024-10-28 DIAGNOSIS — I10 PRIMARY HYPERTENSION: ICD-10-CM

## 2024-10-28 DIAGNOSIS — E11.65 HYPERGLYCEMIA DUE TO DIABETES MELLITUS (HCC): ICD-10-CM

## 2024-10-28 DIAGNOSIS — E11.65 TYPE 2 DIABETES MELLITUS WITH HYPERGLYCEMIA, WITH LONG-TERM CURRENT USE OF INSULIN (HCC): ICD-10-CM

## 2024-10-28 RX ORDER — EZETIMIBE 10 MG/1
10 TABLET ORAL NIGHTLY
Qty: 90 TABLET | Refills: 1 | Status: SHIPPED | OUTPATIENT
Start: 2024-10-28

## 2024-10-28 RX ORDER — BLOOD-GLUCOSE SENSOR
EACH MISCELLANEOUS
Qty: 6 EACH | Refills: 3 | Status: SHIPPED | OUTPATIENT
Start: 2024-10-28

## 2024-10-28 RX ORDER — ATORVASTATIN CALCIUM 40 MG/1
40 TABLET, FILM COATED ORAL NIGHTLY
Qty: 90 TABLET | Refills: 1 | Status: SHIPPED | OUTPATIENT
Start: 2024-10-28

## 2024-10-28 RX ORDER — CARVEDILOL 12.5 MG/1
12.5 TABLET ORAL 2 TIMES DAILY
Qty: 180 TABLET | Refills: 1 | Status: SHIPPED | OUTPATIENT
Start: 2024-10-28

## 2024-10-28 RX ORDER — AMLODIPINE BESYLATE 10 MG/1
10 TABLET ORAL DAILY
Qty: 90 TABLET | Refills: 1 | Status: SHIPPED | OUTPATIENT
Start: 2024-10-28

## 2024-10-28 NOTE — TELEPHONE ENCOUNTER
Requested Prescriptions     Pending Prescriptions Disp Refills    Continuous Glucose Sensor (FREESTYLE PER 3 SENSOR) MISC 6 each 3     Sig: Use as prescribed ,  change every 14 days    ezetimibe (ZETIA) 10 MG tablet 90 tablet 1     Sig: Take 1 tablet by mouth nightly    metFORMIN (GLUCOPHAGE) 500 MG tablet 90 tablet 1     Sig: Take 1 tablet by mouth daily (with breakfast)    Next ov 03/03/2025. Pharmacy confirmed.

## 2024-10-28 NOTE — TELEPHONE ENCOUNTER
Requested Prescriptions     Pending Prescriptions Disp Refills    atorvastatin (LIPITOR) 40 MG tablet 90 tablet 1     Sig: Take 1 tablet by mouth nightly    amLODIPine (NORVASC) 10 MG tablet 90 tablet 1     Sig: Take 1 tablet by mouth daily    carvedilol (COREG) 12.5 MG tablet 180 tablet 1     Sig: Take 1 tablet by mouth 2 times daily    empagliflozin (JARDIANCE) 10 MG tablet 90 tablet 1     Sig: Take 1 tablet by mouth daily      Next ov 03/03/2025. Pharmacy confirmed.

## 2024-11-04 ENCOUNTER — PATIENT MESSAGE (OUTPATIENT)
Dept: INTERNAL MEDICINE CLINIC | Facility: CLINIC | Age: 49
End: 2024-11-04

## 2025-03-01 DIAGNOSIS — N18.32 CHRONIC KIDNEY DISEASE (CKD) STAGE G3B/A1, MODERATELY DECREASED GLOMERULAR FILTRATION RATE (GFR) BETWEEN 30-44 ML/MIN/1.73 SQUARE METER AND ALBUMINURIA CREATININE RATIO LESS THAN 30 MG/G (HCC): ICD-10-CM

## 2025-03-01 DIAGNOSIS — E11.65 HYPERGLYCEMIA DUE TO DIABETES MELLITUS (HCC): ICD-10-CM

## 2025-03-02 ENCOUNTER — PATIENT MESSAGE (OUTPATIENT)
Dept: INTERNAL MEDICINE CLINIC | Facility: CLINIC | Age: 50
End: 2025-03-02

## 2025-03-03 DIAGNOSIS — N18.32 CHRONIC KIDNEY DISEASE (CKD) STAGE G3B/A1, MODERATELY DECREASED GLOMERULAR FILTRATION RATE (GFR) BETWEEN 30-44 ML/MIN/1.73 SQUARE METER AND ALBUMINURIA CREATININE RATIO LESS THAN 30 MG/G (HCC): ICD-10-CM

## 2025-03-03 DIAGNOSIS — E11.65 HYPERGLYCEMIA DUE TO DIABETES MELLITUS (HCC): ICD-10-CM

## 2025-03-03 RX ORDER — EMPAGLIFLOZIN 10 MG/1
10 TABLET, FILM COATED ORAL DAILY
Qty: 90 TABLET | Refills: 1 | OUTPATIENT
Start: 2025-03-03

## 2025-03-03 NOTE — TELEPHONE ENCOUNTER
Appointment canceled for Aleena Baumann ([id number])  Visit type: OFFICE VISIT  3/3/2025 10:40 AM (20 minutes) with Dr. Kenna Fitzgerald MD in [facility]     Reason for cancellation: Moved out of town     Patient comments: I’ve moved out of town however I haven’t been able to find a new primary doctor thus far.. is there anyway possible I could get my refill on my jardience  prescription.     Patient requesting medication refill via [facility]. Patient has moved out of town no new pcp as of today.       Requested Prescriptions     Pending Prescriptions Disp Refills    empagliflozin (JARDIANCE) 10 MG tablet 90 tablet 1     Sig: Take 1 tablet by mouth daily      No ov on file, pharmacy confirmed.

## 2025-03-25 DIAGNOSIS — E11.65 HYPERGLYCEMIA DUE TO DIABETES MELLITUS (HCC): ICD-10-CM

## 2025-03-25 DIAGNOSIS — I10 PRIMARY HYPERTENSION: ICD-10-CM

## 2025-03-25 DIAGNOSIS — E78.5 DYSLIPIDEMIA: ICD-10-CM

## 2025-03-25 RX ORDER — EZETIMIBE 10 MG/1
10 TABLET ORAL NIGHTLY
Qty: 90 TABLET | Refills: 0 | Status: SHIPPED | OUTPATIENT
Start: 2025-03-25

## 2025-03-25 RX ORDER — AMLODIPINE BESYLATE 10 MG/1
10 TABLET ORAL DAILY
Qty: 90 TABLET | Refills: 0 | Status: SHIPPED | OUTPATIENT
Start: 2025-03-25

## 2025-03-25 NOTE — TELEPHONE ENCOUNTER
Requested Prescriptions     Pending Prescriptions Disp Refills    metFORMIN (GLUCOPHAGE) 500 MG tablet [Pharmacy Med Name: METFORMIN  MG TABLET] 90 tablet 0     Sig: TAKE 1 TABLET BY MOUTH EVERY DAY WITH BREAKFAST    amLODIPine (NORVASC) 10 MG tablet [Pharmacy Med Name: AMLODIPINE BESYLATE 10 MG TAB] 90 tablet 0     Sig: TAKE 1 TABLET BY MOUTH EVERY DAY    ezetimibe (ZETIA) 10 MG tablet [Pharmacy Med Name: EZETIMIBE 10 MG TABLET] 90 tablet 0     Sig: TAKE 1 TABLET BY MOUTH EVERY DAY AT NIGHT    Patient will establish with new pcp in [place] within the next two weeks. Confirmed pharmacy.

## 2025-07-02 DIAGNOSIS — E11.65 HYPERGLYCEMIA DUE TO DIABETES MELLITUS (HCC): ICD-10-CM

## 2025-07-02 NOTE — TELEPHONE ENCOUNTER
Requested Prescriptions     Refused Prescriptions Disp Refills    metFORMIN (GLUCOPHAGE) 500 MG tablet [Pharmacy Med Name: METFORMIN  MG TABLET] 90 tablet 0     Sig: TAKE 1 TABLET BY MOUTH EVERY DAY WITH BREAKFAST     Refused By: NIK DYER     Reason for Refusal: Patient no longer under prescriber care